# Patient Record
Sex: FEMALE | Race: WHITE | NOT HISPANIC OR LATINO | URBAN - METROPOLITAN AREA
[De-identification: names, ages, dates, MRNs, and addresses within clinical notes are randomized per-mention and may not be internally consistent; named-entity substitution may affect disease eponyms.]

---

## 2019-01-11 ENCOUNTER — APPOINTMENT (RX ONLY)
Dept: URBAN - METROPOLITAN AREA CLINIC 61 | Facility: CLINIC | Age: 34
Setting detail: DERMATOLOGY
End: 2019-01-11

## 2019-01-11 DIAGNOSIS — L70.0 ACNE VULGARIS: ICD-10-CM

## 2019-01-11 DIAGNOSIS — L81.4 OTHER MELANIN HYPERPIGMENTATION: ICD-10-CM

## 2019-01-11 PROBLEM — D37.01 NEOPLASM OF UNCERTAIN BEHAVIOR OF LIP: Status: ACTIVE | Noted: 2019-01-11

## 2019-01-11 PROCEDURE — ? PRESCRIPTION

## 2019-01-11 PROCEDURE — 99202 OFFICE O/P NEW SF 15 MIN: CPT

## 2019-01-11 PROCEDURE — ? COUNSELING

## 2019-01-11 RX ORDER — HYDROQUINONE 4 %
CREAM (GRAM) TOPICAL
Qty: 1 | Refills: 3 | Status: ERX | COMMUNITY
Start: 2019-01-11

## 2019-01-11 RX ORDER — CLINDAMYCIN PHOSPHATE 10 MG/ML
SOLUTION TOPICAL
Qty: 1 | Refills: 10 | Status: ERX | COMMUNITY
Start: 2019-01-11

## 2019-01-11 RX ORDER — DOXYCYCLINE HYCLATE 100 MG/1
TABLET, COATED ORAL
Qty: 60 | Refills: 6 | Status: ERX | COMMUNITY
Start: 2019-01-11

## 2019-01-11 RX ADMIN — DOXYCYCLINE HYCLATE: 100 TABLET, COATED ORAL at 13:54

## 2019-01-11 RX ADMIN — CLINDAMYCIN PHOSPHATE: 10 SOLUTION TOPICAL at 14:08

## 2019-01-11 RX ADMIN — Medication: at 14:02

## 2019-01-11 ASSESSMENT — LOCATION SIMPLE DESCRIPTION DERM: LOCATION SIMPLE: NECK

## 2019-01-11 ASSESSMENT — LOCATION DETAILED DESCRIPTION DERM
LOCATION DETAILED: LEFT CENTRAL LATERAL NECK
LOCATION DETAILED: RIGHT CENTRAL LATERAL NECK

## 2019-01-11 ASSESSMENT — LOCATION ZONE DERM: LOCATION ZONE: NECK

## 2019-01-11 NOTE — PROCEDURE: COUNSELING
Patient Specific Counseling (Will Not Stick From Patient To Patient): Biopsy when patient is ready.
Detail Level: Detailed

## 2021-07-22 ENCOUNTER — OFFICE VISIT (OUTPATIENT)
Dept: OBSTETRICS AND GYNECOLOGY | Facility: CLINIC | Age: 36
End: 2021-07-22
Attending: OBSTETRICS & GYNECOLOGY
Payer: COMMERCIAL

## 2021-07-22 ENCOUNTER — PROCEDURE VISIT (OUTPATIENT)
Dept: OBSTETRICS AND GYNECOLOGY | Facility: CLINIC | Age: 36
End: 2021-07-22
Payer: COMMERCIAL

## 2021-07-22 VITALS
BODY MASS INDEX: 24.42 KG/M2 | SYSTOLIC BLOOD PRESSURE: 126 MMHG | DIASTOLIC BLOOD PRESSURE: 74 MMHG | WEIGHT: 137.81 LBS | HEIGHT: 63 IN

## 2021-07-22 DIAGNOSIS — N91.2 ABSENT MENSES: Primary | ICD-10-CM

## 2021-07-22 DIAGNOSIS — Z36.89 ESTABLISH GESTATIONAL AGE, ULTRASOUND: ICD-10-CM

## 2021-07-22 DIAGNOSIS — N91.2 ABSENT MENSES: ICD-10-CM

## 2021-07-22 PROCEDURE — 76801 PR US, OB <14WKS, TRANSABD, SINGLE GESTATION: ICD-10-PCS | Mod: S$GLB,,, | Performed by: OBSTETRICS & GYNECOLOGY

## 2021-07-22 PROCEDURE — 99204 OFFICE O/P NEW MOD 45 MIN: CPT | Mod: S$GLB,,, | Performed by: OBSTETRICS & GYNECOLOGY

## 2021-07-22 PROCEDURE — 3008F BODY MASS INDEX DOCD: CPT | Mod: CPTII,S$GLB,, | Performed by: OBSTETRICS & GYNECOLOGY

## 2021-07-22 PROCEDURE — 99999 PR PBB SHADOW E&M-NEW PATIENT-LVL III: CPT | Mod: PBBFAC,,, | Performed by: OBSTETRICS & GYNECOLOGY

## 2021-07-22 PROCEDURE — 1126F PR PAIN SEVERITY QUANTIFIED, NO PAIN PRESENT: ICD-10-PCS | Mod: CPTII,S$GLB,, | Performed by: OBSTETRICS & GYNECOLOGY

## 2021-07-22 PROCEDURE — 1126F AMNT PAIN NOTED NONE PRSNT: CPT | Mod: CPTII,S$GLB,, | Performed by: OBSTETRICS & GYNECOLOGY

## 2021-07-22 PROCEDURE — 99204 PR OFFICE/OUTPT VISIT, NEW, LEVL IV, 45-59 MIN: ICD-10-PCS | Mod: S$GLB,,, | Performed by: OBSTETRICS & GYNECOLOGY

## 2021-07-22 PROCEDURE — 3008F PR BODY MASS INDEX (BMI) DOCUMENTED: ICD-10-PCS | Mod: CPTII,S$GLB,, | Performed by: OBSTETRICS & GYNECOLOGY

## 2021-07-22 PROCEDURE — 99999 PR PBB SHADOW E&M-NEW PATIENT-LVL III: ICD-10-PCS | Mod: PBBFAC,,, | Performed by: OBSTETRICS & GYNECOLOGY

## 2021-07-22 PROCEDURE — 76801 OB US < 14 WKS SINGLE FETUS: CPT | Mod: S$GLB,,, | Performed by: OBSTETRICS & GYNECOLOGY

## 2021-07-22 RX ORDER — METHYLPHENIDATE HYDROCHLORIDE EXTENDED RELEASE 20 MG/1
TABLET ORAL
COMMUNITY
End: 2021-11-29

## 2021-07-29 ENCOUNTER — TELEPHONE (OUTPATIENT)
Dept: OBSTETRICS AND GYNECOLOGY | Facility: CLINIC | Age: 36
End: 2021-07-29

## 2021-08-16 ENCOUNTER — PATIENT MESSAGE (OUTPATIENT)
Dept: ADMINISTRATIVE | Facility: OTHER | Age: 36
End: 2021-08-16

## 2021-08-16 ENCOUNTER — INITIAL PRENATAL (OUTPATIENT)
Dept: OBSTETRICS AND GYNECOLOGY | Facility: CLINIC | Age: 36
End: 2021-08-16
Attending: OBSTETRICS & GYNECOLOGY
Payer: COMMERCIAL

## 2021-08-16 VITALS
DIASTOLIC BLOOD PRESSURE: 84 MMHG | BODY MASS INDEX: 24.95 KG/M2 | WEIGHT: 140.88 LBS | SYSTOLIC BLOOD PRESSURE: 132 MMHG

## 2021-08-16 DIAGNOSIS — O09.511 PRIMIGRAVIDA OF ADVANCED MATERNAL AGE IN FIRST TRIMESTER: Primary | ICD-10-CM

## 2021-08-16 DIAGNOSIS — Z3A.13 13 WEEKS GESTATION OF PREGNANCY: ICD-10-CM

## 2021-08-16 DIAGNOSIS — Z36.89 ENCOUNTER FOR FETAL ANATOMIC SURVEY: ICD-10-CM

## 2021-08-16 PROCEDURE — 0502F PR SUBSEQUENT PRENATAL CARE: ICD-10-PCS | Mod: CPTII,S$GLB,, | Performed by: OBSTETRICS & GYNECOLOGY

## 2021-08-16 PROCEDURE — 0502F SUBSEQUENT PRENATAL CARE: CPT | Mod: CPTII,S$GLB,, | Performed by: OBSTETRICS & GYNECOLOGY

## 2021-08-16 PROCEDURE — 99999 PR PBB SHADOW E&M-EST. PATIENT-LVL II: CPT | Mod: PBBFAC,,, | Performed by: OBSTETRICS & GYNECOLOGY

## 2021-08-16 PROCEDURE — 99999 PR PBB SHADOW E&M-EST. PATIENT-LVL II: ICD-10-PCS | Mod: PBBFAC,,, | Performed by: OBSTETRICS & GYNECOLOGY

## 2021-09-13 ENCOUNTER — ROUTINE PRENATAL (OUTPATIENT)
Dept: OBSTETRICS AND GYNECOLOGY | Facility: CLINIC | Age: 36
End: 2021-09-13
Attending: OBSTETRICS & GYNECOLOGY
Payer: COMMERCIAL

## 2021-09-13 VITALS
DIASTOLIC BLOOD PRESSURE: 68 MMHG | WEIGHT: 144.38 LBS | SYSTOLIC BLOOD PRESSURE: 114 MMHG | BODY MASS INDEX: 25.58 KG/M2

## 2021-09-13 DIAGNOSIS — Z3A.17 17 WEEKS GESTATION OF PREGNANCY: ICD-10-CM

## 2021-09-13 DIAGNOSIS — O09.529 ANTEPARTUM MULTIGRAVIDA OF ADVANCED MATERNAL AGE: Primary | ICD-10-CM

## 2021-09-13 PROCEDURE — 99999 PR PBB SHADOW E&M-EST. PATIENT-LVL III: ICD-10-PCS | Mod: PBBFAC,,, | Performed by: OBSTETRICS & GYNECOLOGY

## 2021-09-13 PROCEDURE — 0502F SUBSEQUENT PRENATAL CARE: CPT | Mod: CPTII,S$GLB,, | Performed by: OBSTETRICS & GYNECOLOGY

## 2021-09-13 PROCEDURE — 0502F PR SUBSEQUENT PRENATAL CARE: ICD-10-PCS | Mod: CPTII,S$GLB,, | Performed by: OBSTETRICS & GYNECOLOGY

## 2021-09-13 PROCEDURE — 99999 PR PBB SHADOW E&M-EST. PATIENT-LVL III: CPT | Mod: PBBFAC,,, | Performed by: OBSTETRICS & GYNECOLOGY

## 2021-09-13 RX ORDER — ASPIRIN 81 MG/1
81 TABLET ORAL DAILY
Status: ON HOLD | COMMUNITY
End: 2022-02-03 | Stop reason: HOSPADM

## 2021-09-19 ENCOUNTER — PATIENT MESSAGE (OUTPATIENT)
Dept: OBSTETRICS AND GYNECOLOGY | Facility: CLINIC | Age: 36
End: 2021-09-19

## 2021-09-24 ENCOUNTER — PATIENT MESSAGE (OUTPATIENT)
Dept: MATERNAL FETAL MEDICINE | Facility: CLINIC | Age: 36
End: 2021-09-24

## 2021-09-27 ENCOUNTER — TELEPHONE (OUTPATIENT)
Dept: OBSTETRICS AND GYNECOLOGY | Facility: CLINIC | Age: 36
End: 2021-09-27

## 2021-09-27 ENCOUNTER — PROCEDURE VISIT (OUTPATIENT)
Dept: MATERNAL FETAL MEDICINE | Facility: CLINIC | Age: 36
End: 2021-09-27
Attending: OBSTETRICS & GYNECOLOGY
Payer: COMMERCIAL

## 2021-09-27 DIAGNOSIS — Z36.89 ENCOUNTER FOR FETAL ANATOMIC SURVEY: ICD-10-CM

## 2021-09-27 DIAGNOSIS — O09.512 PRIMIGRAVIDA OF ADVANCED MATERNAL AGE IN SECOND TRIMESTER: ICD-10-CM

## 2021-09-27 PROCEDURE — 76811 OB US DETAILED SNGL FETUS: CPT | Mod: S$GLB,,, | Performed by: OBSTETRICS & GYNECOLOGY

## 2021-09-27 PROCEDURE — 76811 PR US, OB FETAL EVAL & EXAM, TRANSABDOM,FIRST GESTATION: ICD-10-PCS | Mod: S$GLB,,, | Performed by: OBSTETRICS & GYNECOLOGY

## 2021-10-18 ENCOUNTER — ROUTINE PRENATAL (OUTPATIENT)
Dept: OBSTETRICS AND GYNECOLOGY | Facility: CLINIC | Age: 36
End: 2021-10-18
Attending: OBSTETRICS & GYNECOLOGY
Payer: COMMERCIAL

## 2021-10-18 VITALS — WEIGHT: 147.94 LBS | DIASTOLIC BLOOD PRESSURE: 70 MMHG | SYSTOLIC BLOOD PRESSURE: 104 MMHG | BODY MASS INDEX: 26.2 KG/M2

## 2021-10-18 DIAGNOSIS — Z3A.22 22 WEEKS GESTATION OF PREGNANCY: Primary | ICD-10-CM

## 2021-10-18 DIAGNOSIS — O09.512 PRIMIGRAVIDA OF ADVANCED MATERNAL AGE IN SECOND TRIMESTER: ICD-10-CM

## 2021-10-18 PROCEDURE — 99999 PR PBB SHADOW E&M-EST. PATIENT-LVL II: CPT | Mod: PBBFAC,,, | Performed by: OBSTETRICS & GYNECOLOGY

## 2021-10-18 PROCEDURE — 0502F PR SUBSEQUENT PRENATAL CARE: ICD-10-PCS | Mod: CPTII,S$GLB,, | Performed by: OBSTETRICS & GYNECOLOGY

## 2021-10-18 PROCEDURE — 90686 IIV4 VACC NO PRSV 0.5 ML IM: CPT | Mod: S$GLB,,, | Performed by: OBSTETRICS & GYNECOLOGY

## 2021-10-18 PROCEDURE — 90686 FLU VACCINE (QUAD) GREATER THAN OR EQUAL TO 3YO PRESERVATIVE FREE IM: ICD-10-PCS | Mod: S$GLB,,, | Performed by: OBSTETRICS & GYNECOLOGY

## 2021-10-18 PROCEDURE — 0502F SUBSEQUENT PRENATAL CARE: CPT | Mod: CPTII,S$GLB,, | Performed by: OBSTETRICS & GYNECOLOGY

## 2021-10-18 PROCEDURE — 90471 IMMUNIZATION ADMIN: CPT | Mod: S$GLB,,, | Performed by: OBSTETRICS & GYNECOLOGY

## 2021-10-18 PROCEDURE — 99999 PR PBB SHADOW E&M-EST. PATIENT-LVL II: ICD-10-PCS | Mod: PBBFAC,,, | Performed by: OBSTETRICS & GYNECOLOGY

## 2021-10-18 PROCEDURE — 90471 FLU VACCINE (QUAD) GREATER THAN OR EQUAL TO 3YO PRESERVATIVE FREE IM: ICD-10-PCS | Mod: S$GLB,,, | Performed by: OBSTETRICS & GYNECOLOGY

## 2021-11-11 ENCOUNTER — PATIENT MESSAGE (OUTPATIENT)
Dept: OBSTETRICS AND GYNECOLOGY | Facility: CLINIC | Age: 36
End: 2021-11-11
Payer: COMMERCIAL

## 2021-11-23 ENCOUNTER — PATIENT MESSAGE (OUTPATIENT)
Dept: ADMINISTRATIVE | Facility: OTHER | Age: 36
End: 2021-11-23
Payer: COMMERCIAL

## 2021-11-29 ENCOUNTER — TELEPHONE (OUTPATIENT)
Dept: OBSTETRICS AND GYNECOLOGY | Facility: CLINIC | Age: 36
End: 2021-11-29

## 2021-11-29 ENCOUNTER — ROUTINE PRENATAL (OUTPATIENT)
Dept: OBSTETRICS AND GYNECOLOGY | Facility: CLINIC | Age: 36
End: 2021-11-29
Attending: OBSTETRICS & GYNECOLOGY
Payer: COMMERCIAL

## 2021-11-29 ENCOUNTER — TELEPHONE (OUTPATIENT)
Dept: OBSTETRICS AND GYNECOLOGY | Facility: CLINIC | Age: 36
End: 2021-11-29
Payer: COMMERCIAL

## 2021-11-29 ENCOUNTER — LAB VISIT (OUTPATIENT)
Dept: LAB | Facility: OTHER | Age: 36
End: 2021-11-29
Attending: OBSTETRICS & GYNECOLOGY
Payer: COMMERCIAL

## 2021-11-29 VITALS
SYSTOLIC BLOOD PRESSURE: 100 MMHG | WEIGHT: 154.31 LBS | BODY MASS INDEX: 27.34 KG/M2 | DIASTOLIC BLOOD PRESSURE: 60 MMHG

## 2021-11-29 DIAGNOSIS — R73.09 ELEVATED GLUCOSE: Primary | ICD-10-CM

## 2021-11-29 DIAGNOSIS — O09.513 PRIMIGRAVIDA OF ADVANCED MATERNAL AGE IN THIRD TRIMESTER: Primary | ICD-10-CM

## 2021-11-29 DIAGNOSIS — Z3A.28 28 WEEKS GESTATION OF PREGNANCY: ICD-10-CM

## 2021-11-29 DIAGNOSIS — Z3A.22 22 WEEKS GESTATION OF PREGNANCY: ICD-10-CM

## 2021-11-29 LAB
ERYTHROCYTE [DISTWIDTH] IN BLOOD BY AUTOMATED COUNT: 13 % (ref 11.5–14.5)
GLUCOSE SERPL-MCNC: 184 MG/DL (ref 70–140)
HCT VFR BLD AUTO: 35 % (ref 37–48.5)
HGB BLD-MCNC: 11.8 G/DL (ref 12–16)
MCH RBC QN AUTO: 31.2 PG (ref 27–31)
MCHC RBC AUTO-ENTMCNC: 33.7 G/DL (ref 32–36)
MCV RBC AUTO: 93 FL (ref 82–98)
PLATELET # BLD AUTO: 226 K/UL (ref 150–450)
PMV BLD AUTO: 10.4 FL (ref 9.2–12.9)
RBC # BLD AUTO: 3.78 M/UL (ref 4–5.4)
WBC # BLD AUTO: 8.8 K/UL (ref 3.9–12.7)

## 2021-11-29 PROCEDURE — 36415 COLL VENOUS BLD VENIPUNCTURE: CPT | Performed by: OBSTETRICS & GYNECOLOGY

## 2021-11-29 PROCEDURE — 0502F PR SUBSEQUENT PRENATAL CARE: ICD-10-PCS | Mod: CPTII,S$GLB,, | Performed by: OBSTETRICS & GYNECOLOGY

## 2021-11-29 PROCEDURE — 82950 GLUCOSE TEST: CPT | Performed by: OBSTETRICS & GYNECOLOGY

## 2021-11-29 PROCEDURE — 0502F SUBSEQUENT PRENATAL CARE: CPT | Mod: CPTII,S$GLB,, | Performed by: OBSTETRICS & GYNECOLOGY

## 2021-11-29 PROCEDURE — 99999 PR PBB SHADOW E&M-EST. PATIENT-LVL III: ICD-10-PCS | Mod: PBBFAC,,, | Performed by: OBSTETRICS & GYNECOLOGY

## 2021-11-29 PROCEDURE — 99999 PR PBB SHADOW E&M-EST. PATIENT-LVL III: CPT | Mod: PBBFAC,,, | Performed by: OBSTETRICS & GYNECOLOGY

## 2021-11-29 PROCEDURE — 85027 COMPLETE CBC AUTOMATED: CPT | Performed by: OBSTETRICS & GYNECOLOGY

## 2021-11-29 RX ORDER — AMOXICILLIN 500 MG
CAPSULE ORAL DAILY
COMMUNITY
End: 2023-09-20

## 2021-11-29 RX ORDER — CALCIUM CARBONATE 200(500)MG
1 TABLET,CHEWABLE ORAL DAILY
Status: ON HOLD | COMMUNITY
End: 2022-02-03 | Stop reason: HOSPADM

## 2021-12-01 ENCOUNTER — LAB VISIT (OUTPATIENT)
Dept: LAB | Facility: OTHER | Age: 36
End: 2021-12-01
Attending: OBSTETRICS & GYNECOLOGY
Payer: COMMERCIAL

## 2021-12-01 ENCOUNTER — TELEPHONE (OUTPATIENT)
Dept: OBSTETRICS AND GYNECOLOGY | Facility: CLINIC | Age: 36
End: 2021-12-01
Payer: COMMERCIAL

## 2021-12-01 ENCOUNTER — PATIENT MESSAGE (OUTPATIENT)
Dept: OBSTETRICS AND GYNECOLOGY | Facility: CLINIC | Age: 36
End: 2021-12-01
Payer: COMMERCIAL

## 2021-12-01 DIAGNOSIS — R73.09 ELEVATED GLUCOSE: ICD-10-CM

## 2021-12-01 LAB
GLUCOSE SERPL-MCNC: 118 MG/DL
GLUCOSE SERPL-MCNC: 149 MG/DL
GLUCOSE SERPL-MCNC: 81 MG/DL (ref 70–110)
GLUCOSE SERPL-MCNC: 93 MG/DL

## 2021-12-01 PROCEDURE — 82952 GTT-ADDED SAMPLES: CPT | Performed by: OBSTETRICS & GYNECOLOGY

## 2021-12-01 PROCEDURE — 82951 GLUCOSE TOLERANCE TEST (GTT): CPT | Performed by: OBSTETRICS & GYNECOLOGY

## 2021-12-01 PROCEDURE — 36415 COLL VENOUS BLD VENIPUNCTURE: CPT | Performed by: OBSTETRICS & GYNECOLOGY

## 2021-12-09 ENCOUNTER — PROCEDURE VISIT (OUTPATIENT)
Dept: OBSTETRICS AND GYNECOLOGY | Facility: CLINIC | Age: 36
End: 2021-12-09
Attending: OBSTETRICS & GYNECOLOGY
Payer: COMMERCIAL

## 2021-12-09 VITALS — BODY MASS INDEX: 27.45 KG/M2 | DIASTOLIC BLOOD PRESSURE: 64 MMHG | WEIGHT: 155 LBS | SYSTOLIC BLOOD PRESSURE: 102 MMHG

## 2021-12-09 DIAGNOSIS — O09.529 ANTEPARTUM MULTIGRAVIDA OF ADVANCED MATERNAL AGE: Primary | ICD-10-CM

## 2021-12-09 DIAGNOSIS — Z23 NEED FOR TDAP VACCINATION: Primary | ICD-10-CM

## 2021-12-09 DIAGNOSIS — O09.529 ANTEPARTUM MULTIGRAVIDA OF ADVANCED MATERNAL AGE: ICD-10-CM

## 2021-12-09 DIAGNOSIS — Z3A.30 30 WEEKS GESTATION OF PREGNANCY: ICD-10-CM

## 2021-12-09 DIAGNOSIS — Z23 NEED FOR VACCINATION: ICD-10-CM

## 2021-12-09 PROCEDURE — 99999 PR PBB SHADOW E&M-EST. PATIENT-LVL III: ICD-10-PCS | Mod: PBBFAC,,, | Performed by: OBSTETRICS & GYNECOLOGY

## 2021-12-09 PROCEDURE — 76816 OB US FOLLOW-UP PER FETUS: CPT | Mod: S$GLB,,, | Performed by: OBSTETRICS & GYNECOLOGY

## 2021-12-09 PROCEDURE — 0502F SUBSEQUENT PRENATAL CARE: CPT | Mod: CPTII,S$GLB,, | Performed by: OBSTETRICS & GYNECOLOGY

## 2021-12-09 PROCEDURE — 0502F PR SUBSEQUENT PRENATAL CARE: ICD-10-PCS | Mod: CPTII,S$GLB,, | Performed by: OBSTETRICS & GYNECOLOGY

## 2021-12-09 PROCEDURE — 99999 PR PBB SHADOW E&M-EST. PATIENT-LVL III: CPT | Mod: PBBFAC,,, | Performed by: OBSTETRICS & GYNECOLOGY

## 2021-12-09 PROCEDURE — 90471 IMMUNIZATION ADMIN: CPT | Mod: S$GLB,,, | Performed by: OBSTETRICS & GYNECOLOGY

## 2021-12-09 PROCEDURE — 90471 TDAP VACCINE GREATER THAN OR EQUAL TO 7YO IM: ICD-10-PCS | Mod: S$GLB,,, | Performed by: OBSTETRICS & GYNECOLOGY

## 2021-12-09 PROCEDURE — 76816 US OB/GYN EXTENDED PROCEDURE (VIEWPOINT): ICD-10-PCS | Mod: S$GLB,,, | Performed by: OBSTETRICS & GYNECOLOGY

## 2021-12-09 PROCEDURE — 90715 TDAP VACCINE 7 YRS/> IM: CPT | Mod: S$GLB,,, | Performed by: OBSTETRICS & GYNECOLOGY

## 2021-12-09 PROCEDURE — 90715 TDAP VACCINE GREATER THAN OR EQUAL TO 7YO IM: ICD-10-PCS | Mod: S$GLB,,, | Performed by: OBSTETRICS & GYNECOLOGY

## 2021-12-21 ENCOUNTER — PATIENT MESSAGE (OUTPATIENT)
Dept: ADMINISTRATIVE | Facility: OTHER | Age: 36
End: 2021-12-21
Payer: COMMERCIAL

## 2021-12-27 ENCOUNTER — OFFICE VISIT (OUTPATIENT)
Dept: OBSTETRICS AND GYNECOLOGY | Facility: CLINIC | Age: 36
End: 2021-12-27
Attending: OBSTETRICS & GYNECOLOGY
Payer: COMMERCIAL

## 2021-12-27 DIAGNOSIS — O09.529 ANTEPARTUM MULTIGRAVIDA OF ADVANCED MATERNAL AGE: Primary | ICD-10-CM

## 2021-12-27 DIAGNOSIS — Z3A.33 33 WEEKS GESTATION OF PREGNANCY: ICD-10-CM

## 2021-12-27 DIAGNOSIS — Z3A.32 32 WEEKS GESTATION OF PREGNANCY: ICD-10-CM

## 2021-12-27 PROCEDURE — 1159F PR MEDICATION LIST DOCUMENTED IN MEDICAL RECORD: ICD-10-PCS | Mod: CPTII,95,, | Performed by: OBSTETRICS & GYNECOLOGY

## 2021-12-27 PROCEDURE — 1159F MED LIST DOCD IN RCRD: CPT | Mod: CPTII,95,, | Performed by: OBSTETRICS & GYNECOLOGY

## 2021-12-27 PROCEDURE — 0502F SUBSEQUENT PRENATAL CARE: CPT | Mod: CPTII,95,, | Performed by: OBSTETRICS & GYNECOLOGY

## 2021-12-27 PROCEDURE — 1160F RVW MEDS BY RX/DR IN RCRD: CPT | Mod: CPTII,95,, | Performed by: OBSTETRICS & GYNECOLOGY

## 2021-12-27 PROCEDURE — 1160F PR REVIEW ALL MEDS BY PRESCRIBER/CLIN PHARMACIST DOCUMENTED: ICD-10-PCS | Mod: CPTII,95,, | Performed by: OBSTETRICS & GYNECOLOGY

## 2021-12-27 PROCEDURE — 0502F PR SUBSEQUENT PRENATAL CARE: ICD-10-PCS | Mod: CPTII,95,, | Performed by: OBSTETRICS & GYNECOLOGY

## 2021-12-31 ENCOUNTER — PATIENT MESSAGE (OUTPATIENT)
Dept: OBSTETRICS AND GYNECOLOGY | Facility: CLINIC | Age: 36
End: 2021-12-31
Payer: COMMERCIAL

## 2022-01-10 ENCOUNTER — ROUTINE PRENATAL (OUTPATIENT)
Dept: OBSTETRICS AND GYNECOLOGY | Facility: CLINIC | Age: 37
End: 2022-01-10
Attending: OBSTETRICS & GYNECOLOGY
Payer: COMMERCIAL

## 2022-01-10 VITALS
DIASTOLIC BLOOD PRESSURE: 90 MMHG | WEIGHT: 161.81 LBS | BODY MASS INDEX: 28.66 KG/M2 | SYSTOLIC BLOOD PRESSURE: 128 MMHG

## 2022-01-10 DIAGNOSIS — Z3A.34 34 WEEKS GESTATION OF PREGNANCY: ICD-10-CM

## 2022-01-10 DIAGNOSIS — O09.513 PRIMIGRAVIDA OF ADVANCED MATERNAL AGE IN THIRD TRIMESTER: Primary | ICD-10-CM

## 2022-01-10 PROCEDURE — 0502F PR SUBSEQUENT PRENATAL CARE: ICD-10-PCS | Mod: CPTII,S$GLB,, | Performed by: OBSTETRICS & GYNECOLOGY

## 2022-01-10 PROCEDURE — 0502F SUBSEQUENT PRENATAL CARE: CPT | Mod: CPTII,S$GLB,, | Performed by: OBSTETRICS & GYNECOLOGY

## 2022-01-10 PROCEDURE — 99999 PR PBB SHADOW E&M-EST. PATIENT-LVL III: ICD-10-PCS | Mod: PBBFAC,,, | Performed by: OBSTETRICS & GYNECOLOGY

## 2022-01-10 PROCEDURE — 99999 PR PBB SHADOW E&M-EST. PATIENT-LVL III: CPT | Mod: PBBFAC,,, | Performed by: OBSTETRICS & GYNECOLOGY

## 2022-01-10 NOTE — PROGRESS NOTES
Patient is starting to get more anxious about delivery. See Connected MOM BP; all normal. Staying active (biking and swimming). Baby meets [facility] criteria. Discussed labs, GBBS, consents next visit. Yevgeniy is still working with patient.

## 2022-01-24 ENCOUNTER — PATIENT MESSAGE (OUTPATIENT)
Dept: OBSTETRICS AND GYNECOLOGY | Facility: CLINIC | Age: 37
End: 2022-01-24

## 2022-01-24 ENCOUNTER — ROUTINE PRENATAL (OUTPATIENT)
Dept: OBSTETRICS AND GYNECOLOGY | Facility: CLINIC | Age: 37
End: 2022-01-24
Attending: OBSTETRICS & GYNECOLOGY
Payer: COMMERCIAL

## 2022-01-24 ENCOUNTER — LAB VISIT (OUTPATIENT)
Dept: LAB | Facility: OTHER | Age: 37
End: 2022-01-24
Attending: OBSTETRICS & GYNECOLOGY
Payer: COMMERCIAL

## 2022-01-24 VITALS
WEIGHT: 165.44 LBS | BODY MASS INDEX: 29.31 KG/M2 | DIASTOLIC BLOOD PRESSURE: 84 MMHG | SYSTOLIC BLOOD PRESSURE: 126 MMHG

## 2022-01-24 DIAGNOSIS — O09.513 PRIMIGRAVIDA OF ADVANCED MATERNAL AGE IN THIRD TRIMESTER: Primary | ICD-10-CM

## 2022-01-24 DIAGNOSIS — Z3A.36 36 WEEKS GESTATION OF PREGNANCY: ICD-10-CM

## 2022-01-24 LAB
BASOPHILS # BLD AUTO: 0.03 K/UL (ref 0–0.2)
BASOPHILS NFR BLD: 0.3 % (ref 0–1.9)
DIFFERENTIAL METHOD: NORMAL
EOSINOPHIL # BLD AUTO: 0.1 K/UL (ref 0–0.5)
EOSINOPHIL NFR BLD: 0.8 % (ref 0–8)
ERYTHROCYTE [DISTWIDTH] IN BLOOD BY AUTOMATED COUNT: 12.9 % (ref 11.5–14.5)
HCT VFR BLD AUTO: 38.7 % (ref 37–48.5)
HGB BLD-MCNC: 13.1 G/DL (ref 12–16)
IMM GRANULOCYTES # BLD AUTO: 0.03 K/UL (ref 0–0.04)
IMM GRANULOCYTES NFR BLD AUTO: 0.3 % (ref 0–0.5)
LYMPHOCYTES # BLD AUTO: 2.4 K/UL (ref 1–4.8)
LYMPHOCYTES NFR BLD: 26 % (ref 18–48)
MCH RBC QN AUTO: 31 PG (ref 27–31)
MCHC RBC AUTO-ENTMCNC: 33.9 G/DL (ref 32–36)
MCV RBC AUTO: 92 FL (ref 82–98)
MONOCYTES # BLD AUTO: 0.7 K/UL (ref 0.3–1)
MONOCYTES NFR BLD: 7.3 % (ref 4–15)
NEUTROPHILS # BLD AUTO: 6 K/UL (ref 1.8–7.7)
NEUTROPHILS NFR BLD: 65.3 % (ref 38–73)
NRBC BLD-RTO: 0 /100 WBC
PLATELET # BLD AUTO: 170 K/UL (ref 150–450)
PMV BLD AUTO: 12.7 FL (ref 9.2–12.9)
RBC # BLD AUTO: 4.23 M/UL (ref 4–5.4)
WBC # BLD AUTO: 9.24 K/UL (ref 3.9–12.7)

## 2022-01-24 PROCEDURE — 0502F SUBSEQUENT PRENATAL CARE: CPT | Mod: CPTII,S$GLB,, | Performed by: OBSTETRICS & GYNECOLOGY

## 2022-01-24 PROCEDURE — 0502F PR SUBSEQUENT PRENATAL CARE: ICD-10-PCS | Mod: CPTII,S$GLB,, | Performed by: OBSTETRICS & GYNECOLOGY

## 2022-01-24 PROCEDURE — 85025 COMPLETE CBC W/AUTO DIFF WBC: CPT | Performed by: OBSTETRICS & GYNECOLOGY

## 2022-01-24 PROCEDURE — 87081 CULTURE SCREEN ONLY: CPT | Performed by: OBSTETRICS & GYNECOLOGY

## 2022-01-24 PROCEDURE — 99999 PR PBB SHADOW E&M-EST. PATIENT-LVL III: ICD-10-PCS | Mod: PBBFAC,,, | Performed by: OBSTETRICS & GYNECOLOGY

## 2022-01-24 PROCEDURE — 86592 SYPHILIS TEST NON-TREP QUAL: CPT | Performed by: OBSTETRICS & GYNECOLOGY

## 2022-01-24 PROCEDURE — 87389 HIV-1 AG W/HIV-1&-2 AB AG IA: CPT | Performed by: OBSTETRICS & GYNECOLOGY

## 2022-01-24 PROCEDURE — 99999 PR PBB SHADOW E&M-EST. PATIENT-LVL III: CPT | Mod: PBBFAC,,, | Performed by: OBSTETRICS & GYNECOLOGY

## 2022-01-24 PROCEDURE — 36415 COLL VENOUS BLD VENIPUNCTURE: CPT | Performed by: OBSTETRICS & GYNECOLOGY

## 2022-01-24 NOTE — PROGRESS NOTES
Getting uncomfortable but still remains active. [place]ed [facility]. Consents, GBBS, labs done. Labor/bleeding precautions given. Patient will send birth plan to me in portal message for us to review at her next appointment.

## 2022-01-25 LAB
HIV 1+2 AB+HIV1 P24 AG SERPL QL IA: NEGATIVE
RPR SER QL: NORMAL

## 2022-01-27 LAB — BACTERIA SPEC AEROBE CULT: NORMAL

## 2022-01-31 ENCOUNTER — HOSPITAL ENCOUNTER (INPATIENT)
Facility: OTHER | Age: 37
LOS: 1 days | Discharge: HOME | DRG: 833 | End: 2022-01-31
Attending: OBSTETRICS & GYNECOLOGY | Admitting: OBSTETRICS & GYNECOLOGY
Payer: COMMERCIAL

## 2022-01-31 ENCOUNTER — PATIENT MESSAGE (OUTPATIENT)
Dept: OBSTETRICS AND GYNECOLOGY | Facility: OTHER | Age: 37
End: 2022-01-31
Payer: COMMERCIAL

## 2022-01-31 ENCOUNTER — ROUTINE PRENATAL (OUTPATIENT)
Dept: OBSTETRICS AND GYNECOLOGY | Facility: CLINIC | Age: 37
DRG: 833 | End: 2022-01-31
Attending: OBSTETRICS & GYNECOLOGY
Payer: COMMERCIAL

## 2022-01-31 VITALS
DIASTOLIC BLOOD PRESSURE: 69 MMHG | SYSTOLIC BLOOD PRESSURE: 98 MMHG | TEMPERATURE: 98 F | OXYGEN SATURATION: 96 % | HEART RATE: 65 BPM | RESPIRATION RATE: 18 BRPM

## 2022-01-31 VITALS
WEIGHT: 164.56 LBS | SYSTOLIC BLOOD PRESSURE: 128 MMHG | BODY MASS INDEX: 29.15 KG/M2 | DIASTOLIC BLOOD PRESSURE: 100 MMHG

## 2022-01-31 DIAGNOSIS — O09.513 PRIMIGRAVIDA OF ADVANCED MATERNAL AGE IN THIRD TRIMESTER: Primary | ICD-10-CM

## 2022-01-31 DIAGNOSIS — O13.3 GESTATIONAL HYPERTENSION, THIRD TRIMESTER: Primary | ICD-10-CM

## 2022-01-31 DIAGNOSIS — Z3A.37 37 WEEKS GESTATION OF PREGNANCY: ICD-10-CM

## 2022-01-31 PROBLEM — D69.6 BENIGN GESTATIONAL THROMBOCYTOPENIA IN THIRD TRIMESTER: Status: ACTIVE | Noted: 2022-01-31

## 2022-01-31 PROBLEM — O99.113 BENIGN GESTATIONAL THROMBOCYTOPENIA IN THIRD TRIMESTER: Status: ACTIVE | Noted: 2022-01-31

## 2022-01-31 LAB
ALBUMIN SERPL BCP-MCNC: 2.9 G/DL (ref 3.5–5.2)
ALP SERPL-CCNC: 176 U/L (ref 55–135)
ALT SERPL W/O P-5'-P-CCNC: 9 U/L (ref 10–44)
ANION GAP SERPL CALC-SCNC: 10 MMOL/L (ref 8–16)
AST SERPL-CCNC: 25 U/L (ref 10–40)
BASOPHILS # BLD AUTO: 0.03 K/UL (ref 0–0.2)
BASOPHILS NFR BLD: 0.4 % (ref 0–1.9)
BILIRUB SERPL-MCNC: 0.4 MG/DL (ref 0.1–1)
BUN SERPL-MCNC: 11 MG/DL (ref 6–20)
CALCIUM SERPL-MCNC: 9.4 MG/DL (ref 8.7–10.5)
CHLORIDE SERPL-SCNC: 109 MMOL/L (ref 95–110)
CO2 SERPL-SCNC: 16 MMOL/L (ref 23–29)
CREAT SERPL-MCNC: 0.8 MG/DL (ref 0.5–1.4)
CREAT UR-MCNC: 22.8 MG/DL (ref 15–325)
DIFFERENTIAL METHOD: ABNORMAL
EOSINOPHIL # BLD AUTO: 0.1 K/UL (ref 0–0.5)
EOSINOPHIL NFR BLD: 1 % (ref 0–8)
ERYTHROCYTE [DISTWIDTH] IN BLOOD BY AUTOMATED COUNT: 12.9 % (ref 11.5–14.5)
EST. GFR  (AFRICAN AMERICAN): >60 ML/MIN/1.73 M^2
EST. GFR  (NON AFRICAN AMERICAN): >60 ML/MIN/1.73 M^2
GLUCOSE SERPL-MCNC: 71 MG/DL (ref 70–110)
HCT VFR BLD AUTO: 37.9 % (ref 37–48.5)
HGB BLD-MCNC: 13 G/DL (ref 12–16)
IMM GRANULOCYTES # BLD AUTO: 0.06 K/UL (ref 0–0.04)
IMM GRANULOCYTES NFR BLD AUTO: 0.7 % (ref 0–0.5)
LYMPHOCYTES # BLD AUTO: 2.1 K/UL (ref 1–4.8)
LYMPHOCYTES NFR BLD: 25.8 % (ref 18–48)
MCH RBC QN AUTO: 31.5 PG (ref 27–31)
MCHC RBC AUTO-ENTMCNC: 34.3 G/DL (ref 32–36)
MCV RBC AUTO: 92 FL (ref 82–98)
MONOCYTES # BLD AUTO: 0.6 K/UL (ref 0.3–1)
MONOCYTES NFR BLD: 7.1 % (ref 4–15)
NEUTROPHILS # BLD AUTO: 5.4 K/UL (ref 1.8–7.7)
NEUTROPHILS NFR BLD: 65 % (ref 38–73)
NRBC BLD-RTO: 0 /100 WBC
PLATELET # BLD AUTO: 146 K/UL (ref 150–450)
PMV BLD AUTO: 11.8 FL (ref 9.2–12.9)
POTASSIUM SERPL-SCNC: 4 MMOL/L (ref 3.5–5.1)
PROT SERPL-MCNC: 6.5 G/DL (ref 6–8.4)
PROT UR-MCNC: <7 MG/DL (ref 0–15)
PROT/CREAT UR: NORMAL MG/G{CREAT} (ref 0–0.2)
RBC # BLD AUTO: 4.13 M/UL (ref 4–5.4)
SODIUM SERPL-SCNC: 135 MMOL/L (ref 136–145)
WBC # BLD AUTO: 8.28 K/UL (ref 3.9–12.7)

## 2022-01-31 PROCEDURE — 84156 ASSAY OF PROTEIN URINE: CPT

## 2022-01-31 PROCEDURE — 99999 PR PBB SHADOW E&M-EST. PATIENT-LVL III: CPT | Mod: PBBFAC,,, | Performed by: OBSTETRICS & GYNECOLOGY

## 2022-01-31 PROCEDURE — 80053 COMPREHEN METABOLIC PANEL: CPT

## 2022-01-31 PROCEDURE — 99284 EMERGENCY DEPT VISIT MOD MDM: CPT | Mod: 25,,, | Performed by: OBSTETRICS & GYNECOLOGY

## 2022-01-31 PROCEDURE — 59025 FETAL NON-STRESS TEST: CPT

## 2022-01-31 PROCEDURE — 11000001 HC ACUTE MED/SURG PRIVATE ROOM

## 2022-01-31 PROCEDURE — 59025 FETAL NON-STRESS TEST: CPT | Mod: 26,,, | Performed by: OBSTETRICS & GYNECOLOGY

## 2022-01-31 PROCEDURE — 99284 EMERGENCY DEPT VISIT MOD MDM: CPT | Mod: 25

## 2022-01-31 PROCEDURE — 85025 COMPLETE CBC W/AUTO DIFF WBC: CPT

## 2022-01-31 PROCEDURE — 99999 PR PBB SHADOW E&M-EST. PATIENT-LVL III: ICD-10-PCS | Mod: PBBFAC,,, | Performed by: OBSTETRICS & GYNECOLOGY

## 2022-01-31 PROCEDURE — 0502F PR SUBSEQUENT PRENATAL CARE: ICD-10-PCS | Mod: CPTII,S$GLB,, | Performed by: OBSTETRICS & GYNECOLOGY

## 2022-01-31 PROCEDURE — 0502F SUBSEQUENT PRENATAL CARE: CPT | Mod: CPTII,S$GLB,, | Performed by: OBSTETRICS & GYNECOLOGY

## 2022-01-31 PROCEDURE — 59025 PR FETAL 2N-STRESS TEST: ICD-10-PCS | Mod: 26,,, | Performed by: OBSTETRICS & GYNECOLOGY

## 2022-01-31 PROCEDURE — 99284 PR EMERGENCY DEPT VISIT,LEVEL IV: ICD-10-PCS | Mod: 25,,, | Performed by: OBSTETRICS & GYNECOLOGY

## 2022-01-31 RX ORDER — CEFAZOLIN SODIUM 2 G/50ML
2 SOLUTION INTRAVENOUS ONCE AS NEEDED
Status: DISCONTINUED | OUTPATIENT
Start: 2022-01-31 | End: 2022-01-31 | Stop reason: HOSPADM

## 2022-01-31 RX ORDER — CALCIUM CARBONATE 200(500)MG
500 TABLET,CHEWABLE ORAL 3 TIMES DAILY PRN
Status: DISCONTINUED | OUTPATIENT
Start: 2022-01-31 | End: 2022-01-31 | Stop reason: HOSPADM

## 2022-01-31 RX ORDER — ONDANSETRON 8 MG/1
8 TABLET, ORALLY DISINTEGRATING ORAL EVERY 8 HOURS PRN
Status: DISCONTINUED | OUTPATIENT
Start: 2022-01-31 | End: 2022-01-31 | Stop reason: HOSPADM

## 2022-01-31 RX ORDER — OXYTOCIN/RINGER'S LACTATE 30/500 ML
334 PLASTIC BAG, INJECTION (ML) INTRAVENOUS ONCE
Status: DISCONTINUED | OUTPATIENT
Start: 2022-01-31 | End: 2022-01-31 | Stop reason: HOSPADM

## 2022-01-31 RX ORDER — PROCHLORPERAZINE EDISYLATE 5 MG/ML
5 INJECTION INTRAMUSCULAR; INTRAVENOUS EVERY 6 HOURS PRN
Status: DISCONTINUED | OUTPATIENT
Start: 2022-01-31 | End: 2022-01-31 | Stop reason: HOSPADM

## 2022-01-31 RX ORDER — SODIUM CHLORIDE 9 MG/ML
INJECTION, SOLUTION INTRAVENOUS
Status: DISCONTINUED | OUTPATIENT
Start: 2022-01-31 | End: 2022-01-31 | Stop reason: HOSPADM

## 2022-01-31 RX ORDER — METHYLERGONOVINE MALEATE 0.2 MG/ML
200 INJECTION INTRAVENOUS
Status: CANCELLED | OUTPATIENT
Start: 2022-01-31

## 2022-01-31 RX ORDER — TRANEXAMIC ACID 100 MG/ML
1000 INJECTION, SOLUTION INTRAVENOUS ONCE AS NEEDED
Status: DISCONTINUED | OUTPATIENT
Start: 2022-01-31 | End: 2022-01-31 | Stop reason: HOSPADM

## 2022-01-31 RX ORDER — CARBOPROST TROMETHAMINE 250 UG/ML
250 INJECTION, SOLUTION INTRAMUSCULAR
Status: CANCELLED | OUTPATIENT
Start: 2022-01-31

## 2022-01-31 RX ORDER — DIPHENOXYLATE HYDROCHLORIDE AND ATROPINE SULFATE 2.5; .025 MG/1; MG/1
1 TABLET ORAL 4 TIMES DAILY PRN
Status: CANCELLED | OUTPATIENT
Start: 2022-01-31

## 2022-01-31 RX ORDER — SIMETHICONE 80 MG
1 TABLET,CHEWABLE ORAL 4 TIMES DAILY PRN
Status: DISCONTINUED | OUTPATIENT
Start: 2022-01-31 | End: 2022-01-31 | Stop reason: HOSPADM

## 2022-01-31 RX ORDER — SODIUM CHLORIDE, SODIUM LACTATE, POTASSIUM CHLORIDE, CALCIUM CHLORIDE 600; 310; 30; 20 MG/100ML; MG/100ML; MG/100ML; MG/100ML
INJECTION, SOLUTION INTRAVENOUS CONTINUOUS
Status: DISCONTINUED | OUTPATIENT
Start: 2022-01-31 | End: 2022-01-31 | Stop reason: HOSPADM

## 2022-01-31 RX ORDER — OXYTOCIN/RINGER'S LACTATE 30/500 ML
95 PLASTIC BAG, INJECTION (ML) INTRAVENOUS ONCE
Status: DISCONTINUED | OUTPATIENT
Start: 2022-01-31 | End: 2022-01-31 | Stop reason: HOSPADM

## 2022-01-31 RX ORDER — MISOPROSTOL 200 UG/1
800 TABLET ORAL
Status: CANCELLED | OUTPATIENT
Start: 2022-01-31

## 2022-01-31 NOTE — H&P
HISTORY AND PHYSICAL                                                OBSTETRICS          Subjective:       Shadia Lake is a 36 y.o.  female with IUP at 38w0d weeks gestation who presents for induction of labor. This IUP is complicated by gestational hypertension, gestational thrombocytopenia, advanced maternal age.  Patient denies contractions, denies vaginal bleeding, denies LOF.   Fetal Movement: normal.     PMHx:   Past Medical History:   Diagnosis Date    Abnormal Pap smear of cervix     unsure of results- had bx which came back wnl per pt and normal pap since        PSHx:   Past Surgical History:   Procedure Laterality Date    WISDOM TOOTH EXTRACTION         All:   Review of patient's allergies indicates:   Allergen Reactions    Penicillins Rash       Meds:   No medications prior to admission.       SH:   Social History     Socioeconomic History    Marital status:    Tobacco Use    Smoking status: Never Smoker    Smokeless tobacco: Never Used   Substance and Sexual Activity    Alcohol use: Not Currently    Drug use: Never    Sexual activity: Yes     Partners: Male     Birth control/protection: None       FH:   Family History   Problem Relation Age of Onset    Stroke Paternal Grandfather     Lung cancer Paternal Grandmother     Diabetes Maternal Grandfather     Sleep apnea Father     Diabetes Maternal Uncle     Breast cancer Neg Hx     Colon cancer Neg Hx     Hypertension Neg Hx     Ovarian cancer Neg Hx        OBHx:   OB History    Para Term  AB Living   1 0 0 0 0 0   SAB IAB Ectopic Multiple Live Births   0 0 0 0 0      # Outcome Date GA Lbr Juan/2nd Weight Sex Delivery Anes PTL Lv   1 Current                Objective:       LMP 2021 (Exact Date)     There were no vitals filed for this visit.    General:   alert, appears stated age and cooperative   Lungs:   clear to auscultation bilaterally   Heart:   regular rate and rhythm, S1, S2 normal, no murmur,  click, rub or gallop   Abdomen:  soft, non-tender; bowel sounds normal; no masses,  no organomegaly   Extremities negative edema, negative erythema   FHT: See admit FHT monitoring                 TOCO: See admit FHT monitoring   Presentations: cephalic by ultrasound   Cervix:     Dilation: Closed    Effacement: 50%    Station:  -3    Consistency: soft    Position: posterior       EFW by Leopold's: 7 1/2 lbs    Lab Review  Blood Type: A positive  GBBS: negative  Rubella: Immune  RPR: nonreactive  HIV: negative  HepB: negative     (Patient's initial prenatal labs are in media tab under prenatal record 7/22/21)    [id number]: negative, 46XX    Assessment:       38w0d weeks gestation, gestational hypertension, gestational thrombocytopenia, advanced maternal age.    - for induction of labor     Plan:      Risks, benefits, alternatives and possible complications have been discussed in detail with the patient.   - Consents signed and to chart  - Admit to Labor and Delivery unit  - Epidural per Anesthesia prn  - Draw CBC, T&S    Post-Partum Hemorrhage risk - low

## 2022-01-31 NOTE — NURSING
Pt signed AMA form and left Against Medical Advice. Scheduled to return on 2/1/2022 at 0500 for induction.

## 2022-01-31 NOTE — PROGRESS NOTES
Patient denies s/s pre-eclampsia. Baby moves a lot. Reviewed [facility]. Discussed need to go to OB ED for evaluation right now. Patient is closing on her house today. She was able to move closing to right now then go straight to OB ED. She's aware that she'll have serial BP checks and labs. She's aware she may need induction today pending results.

## 2022-01-31 NOTE — MEDICAL/APP STUDENT
[facility] Emergency Dept (OB)  Obstetrics  History & Physical    Patient Name: Shadia Lake  MRN: [id number]  Admission Date: 2022  Primary Care Provider: Primary Doctor No    Subjective:     Principal Problem:Gestational hypertension, third trimester    History of Present Illness: elevated blood pressure reading at clinic appointment.     36F  at 37 weeks 6 days presents with complaint of elevated blood pressure reading at clinic visit today. At clinic reading was 128/100 and on arrival here was 131/96. Patient does report feeling more stressed than normal lately. Denies headache, vision changes, nausea, vomiting, contractions, vaginal bleeding, urinary changes, or pelvic pain. No LOF. Eating enough food and drinking enough water. Reports fetal movement.     Obstetric HPI:  Patient reports None contractions, active fetal movement, No vaginal bleeding , No loss of fluid     This pregnancy has been complicated by no complications.     OB History    Para Term  AB Living   1 0 0 0 0 0   SAB IAB Ectopic Multiple Live Births   0 0 0 0 0      # Outcome Date GA Lbr Juan/2nd Weight Sex Delivery Anes PTL Lv   1 Current              Past Medical History:   Diagnosis Date    Abnormal Pap smear of cervix     unsure of results- had bx which came back wnl per pt and normal pap since      Past Surgical History:   Procedure Laterality Date    WISDOM TOOTH EXTRACTION         (Not in a hospital admission)        Review of patient's allergies indicates:   Allergen Reactions    Penicillins Rash        Family History     Problem Relation (Age of Onset)    Diabetes Maternal Grandfather, Maternal Uncle    Lung cancer Paternal Grandmother    Sleep apnea Father    Stroke Paternal Grandfather        Tobacco Use    Smoking status: Never Smoker    Smokeless tobacco: Never Used   Substance and Sexual Activity    Alcohol use: Not Currently    Drug use: Never    Sexual activity: Yes     Partners: Male     Birth  control/protection: None     Review of Systems   Eyes: Negative for visual disturbance.   Gastrointestinal: Negative for abdominal pain, nausea and vomiting.   Genitourinary: Negative for vaginal bleeding.   Neurological: Negative for headaches.      Objective:     Vital Signs (Most Recent):  Temp: 97.7 °F (36.5 °C) (22 1219)  Pulse: 65 (22 1417)  Resp: 18 (22 1219)  BP: 98/69 (22 1417)  SpO2: 96 % (22 1400) Vital Signs (24h Range):  Temp:  [97.7 °F (36.5 °C)] 97.7 °F (36.5 °C)  Pulse:  [61-78] 65  Resp:  [18] 18  SpO2:  [95 %-98 %] 96 %  BP: ()/() 98/69        There is no height or weight on file to calculate BMI.    FHT: 145    Physical Exam:   Constitutional: She appears well-developed and well-nourished.       Cardiovascular: Normal rate, regular rhythm and normal heart sounds.     Pulmonary/Chest: Effort normal and breath sounds normal.        Abdominal: Bowel sounds are normal. She exhibits distension (gravid uterus).             Musculoskeletal: Normal range of motion. No edema.       Neurological: She is alert.    Skin: Skin is warm and dry.        Performed by resident physician.     Significant Labs:  Lab Results   Component Value Date    STREPBCULT No Group B Streptococcus isolated 2022       Assessment/Plan:     36 y.o. female  at 25w6d for: elevated blood pressure readings at OB office.     Active Diagnoses:    Diagnosis Date Noted POA    PRINCIPAL PROBLEM:  Gestational hypertension, third trimester [O13.3] 2022 Yes    Benign gestational thrombocytopenia in third trimester [O99.113, D69.6] 2022 Yes    Primigravida of advanced maternal age in third trimester [O09.513] 2022 Yes      Problems Resolved During this Admission:         Due to serial elevated blood pressure readings, patient meets criteria for gestational HTN. She was counseled on risks of elevated blood pressure in pregnancy. CBC, CMP, and urine came back WNL today.  Advised for admission and induction of labor due to elevated blood pressure. Patient states she needs to go home to charge her phone and get things in order for delivery. She is agreeable to signing out AMA and was provided information on risks of leaving at this time. Patient will return tomorrow for labor induction or sooner if she experiences any severe symptoms.     Cailin Ledezma MS3  Obstetrics  [facility] - Emergency Dept (OB)

## 2022-01-31 NOTE — ED PROVIDER NOTES
Encounter Date: 2022       History     Chief Complaint   Patient presents with    Hypertension     Shadia Lake is a 36 y.o. [id number] at 37w6d presents complaining of elevated blood pressures. The patient was seen by primary OB for routine prenatal visit where she was noted to have an elevated BP of 128/100. She was sent for further evaluation and PreE rule out. The patient denies headaches, changes in vision, chest pain, SOB, RUQ pain, or new onset edema.   This IUP is complicated by AMA.  Patient denies contractions, denies vaginal bleeding, denies LOF.   Fetal Movement: normal.        Review of patient's allergies indicates:   Allergen Reactions    Penicillins Rash     Past Medical History:   Diagnosis Date    Abnormal Pap smear of cervix     unsure of results- had bx which came back wnl per pt and normal pap since      Past Surgical History:   Procedure Laterality Date    WISDOM TOOTH EXTRACTION       Family History   Problem Relation Age of Onset    Stroke Paternal Grandfather     Lung cancer Paternal Grandmother     Diabetes Maternal Grandfather     Sleep apnea Father     Diabetes Maternal Uncle     Breast cancer Neg Hx     Colon cancer Neg Hx     Hypertension Neg Hx     Ovarian cancer Neg Hx      Social History     Tobacco Use    Smoking status: Never Smoker    Smokeless tobacco: Never Used   Substance Use Topics    Alcohol use: Not Currently    Drug use: Never     Review of Systems   Constitutional: Negative for chills, fatigue and fever.   HENT: Negative for congestion, rhinorrhea and sore throat.    Eyes: Negative for visual disturbance.   Respiratory: Negative for cough, chest tightness and shortness of breath.    Cardiovascular: Negative for chest pain and leg swelling.   Gastrointestinal: Negative for abdominal pain, nausea and vomiting.   Genitourinary: Negative for dysuria, pelvic pain, vaginal bleeding, vaginal discharge and vaginal pain.   Musculoskeletal: Negative for back  pain.   Neurological: Negative for dizziness and headaches.       Physical Exam     Initial Vitals   BP Pulse Resp Temp SpO2   01/31/22 1217 01/31/22 1217 01/31/22 1219 01/31/22 1219 01/31/22 1249   (!) 131/96 64 18 97.7 °F (36.5 °C) 98 %      MAP       --              Temp:  [97.7 °F (36.5 °C)] 97.7 °F (36.5 °C)  Pulse:  [61-78] 65  Resp:  [18] 18  SpO2:  [95 %-98 %] 96 %  BP: ()/() 98/69      Physical Exam    Constitutional: She appears well-developed and well-nourished. No distress.   HENT:   Head: Normocephalic and atraumatic.   Eyes: Conjunctivae and EOM are normal.   Neck: Neck supple. No thyromegaly present.   Normal range of motion.  Cardiovascular: Normal rate.   Pulmonary/Chest:   Normal work of breathing.   Abdominal: Abdomen is soft. There is no abdominal tenderness.   Gravid uterus. There is no rebound and no guarding.   Genitourinary:    Uterus normal.     Musculoskeletal:         General: No tenderness or edema. Normal range of motion.      Cervical back: Normal range of motion and neck supple.     Neurological: She is alert and oriented to person, place, and time. She has normal reflexes.   Skin: Skin is warm and dry.   Psychiatric: She has a normal mood and affect. Thought content normal.         ED Course   Obtain Fetal nonstress test (NST)    Date/Time: 1/31/2022 1:07 PM  Performed by: Oma Peng MD  Authorized by: Oma Peng MD     Nonstress Test:     Variability:  6-25 BPM    Decelerations:  None    Accelerations:  15 bpm    Baseline:  130    Contractions:  Not present  Biophysical Profile:     Nonstress Test Interpretation: reactive      Overall Impression:  Reassuring  Post-procedure:     Patient tolerance:  Patient tolerated the procedure well with no immediate complications      Labs Reviewed   CBC W/ AUTO DIFFERENTIAL - Abnormal; Notable for the following components:       Result Value    MCH 31.5 (*)     Platelets 146 (*)     Immature Granulocytes 0.7 (*)     Immature  Grans (Abs) 0.06 (*)     All other components within normal limits   COMPREHENSIVE METABOLIC PANEL - Abnormal; Notable for the following components:    Sodium 135 (*)     CO2 16 (*)     Albumin 2.9 (*)     Alkaline Phosphatase 176 (*)     ALT 9 (*)     All other components within normal limits   PROTEIN / CREATININE RATIO, URINE    Narrative:     Specimen Source->Urine   RUBELLA ANTIBODY, IGG   SARS-COV-2 RDRP GENE   TYPE AND SCREEN LABOR & DELIVERY          Imaging Results    None          Medications   lactated ringers bolus 1,000 mL (has no administration in time range)   lactated ringers bolus 500 mL (has no administration in time range)   lactated ringers infusion (has no administration in time range)   0.9%  NaCl infusion (has no administration in time range)   calcium carbonate 200 mg calcium (500 mg) chewable tablet 500 mg (has no administration in time range)   simethicone chewable tablet 80 mg (has no administration in time range)   ondansetron disintegrating tablet 8 mg (has no administration in time range)   prochlorperazine injection Soln 5 mg (has no administration in time range)   oxytocin 30 units in 500 mL lactated ringers infusion (non-titrating) (has no administration in time range)   oxytocin 30 units in 500 mL lactated ringers infusion (non-titrating) (has no administration in time range)   tranexamic acid (CYKLOKAPRON) 1,000 mg in sodium chloride 0.9 % 100 mL (ready to mix system) (has no administration in time range)   azithromycin 500 mg in dextrose 5 % 250 mL IVPB (ready to mix system) (has no administration in time range)   cefazolin (ANCEF) 2 gram in dextrose 5% 50 mL IVPB (premix) (has no administration in time range)     Medical Decision Making:   ED Management:  Two mild range blood pressures noted upon patient's arrival  PreE labs collected:     - P:C too low to calculate     - CMP wnl     - Platelets 146  Patient is asymptomatic  Patient now meets criteria for gestational hypertension  and recommendation for admission for induction of labor discussed  Patient states that she cannot stay for induction at this time due to life events (closing of new house, needing to install carseat), however she is amendable to returning for induction tomorrow morning  Primary OB contacted and induction scheduled for 5 AM tomorrow morning  AMA form reviewed and signed with patient. Patient understands risks of development of severe pre-eclampsia, eclampsia, matternal stroke, CVA, death, placenta abruption and hemorrhage, or fetal death  Stressed the importance of returning if she develops persistent headache, changes in vision, chest pain, SOB, RUQ pain, new onset edema, or if she just doesn't feel like herself.  Patient states she has connected mom and will check her BP at home and return if she has severe range BPs.             Attending Attestation:   Physician Attestation Statement for Resident:  As the supervising MD   Physician Attestation Statement: I have personally seen and examined this patient.   I agree with the above history. -:   As the supervising MD I agree with the above PE.    As the supervising MD I agree with the above treatment, course, plan, and disposition.  I was personally present during the critical portions of the procedure(s) performed by the resident and was immediately available in the ED to provide services and assistance as needed during the entire procedure.  I have reviewed and agree with the residents interpretation of the following: lab data.  I have reviewed the following: old records at this facility.                         Clinical Impression:   Final diagnoses:  [O13.3] Gestational hypertension, third trimester (Primary)  [Z3A.37] 37 weeks gestation of pregnancy          ED Disposition Condition    AMA               Heriberto Shaw MD  Resident  01/31/22 1510       Cheryl Fraire MD  01/31/22 1612

## 2022-01-31 NOTE — Clinical Note
I certify that Inpatient services for greater than or equal to 2 midnights are medically necessary:: Yes   Transfer To (Destination): [facility] LABOR AND DELIVERY [[id number]]   Diagnosis: Gestational hypertension, third trimester [166104]   Admitting Provider:: LOKI RAMOS [[id number]]   Future Attending Provider: KARAN ARAUJO [[id number]]   Reason for IP Medical Treatment  (Clinical interventions that can only be accomplished in the IP setting? ) :: Labor and delivery   Estimated Length of Stay:: 2 midnights   Plans for Post-Acute care--if anticipated (pick the single best option):: A. No post acute care anticipated at this time   Special Needs:: No Special Needs [1]

## 2022-02-01 ENCOUNTER — HOSPITAL ENCOUNTER (INPATIENT)
Facility: OTHER | Age: 37
LOS: 2 days | Discharge: HOME | End: 2022-02-03
Attending: OBSTETRICS & GYNECOLOGY | Admitting: OBSTETRICS & GYNECOLOGY
Payer: COMMERCIAL

## 2022-02-01 ENCOUNTER — ANESTHESIA EVENT (OUTPATIENT)
Dept: OBSTETRICS AND GYNECOLOGY | Facility: OTHER | Age: 37
End: 2022-02-01
Payer: COMMERCIAL

## 2022-02-01 ENCOUNTER — ANESTHESIA (OUTPATIENT)
Dept: OBSTETRICS AND GYNECOLOGY | Facility: OTHER | Age: 37
End: 2022-02-01
Payer: COMMERCIAL

## 2022-02-01 DIAGNOSIS — Z34.90 TERM PREGNANCY: ICD-10-CM

## 2022-02-01 DIAGNOSIS — O13.3 GESTATIONAL HYPERTENSION, THIRD TRIMESTER: Primary | ICD-10-CM

## 2022-02-01 DIAGNOSIS — Z34.90 ENCOUNTER FOR INDUCTION OF LABOR: ICD-10-CM

## 2022-02-01 LAB
ABO + RH BLD: NORMAL
ALBUMIN SERPL BCP-MCNC: 3.1 G/DL (ref 3.5–5.2)
ALP SERPL-CCNC: 204 U/L (ref 55–135)
ALT SERPL W/O P-5'-P-CCNC: 14 U/L (ref 10–44)
ANION GAP SERPL CALC-SCNC: 12 MMOL/L (ref 8–16)
AST SERPL-CCNC: 32 U/L (ref 10–40)
BASOPHILS # BLD AUTO: 0.01 K/UL (ref 0–0.2)
BASOPHILS NFR BLD: 0.1 % (ref 0–1.9)
BILIRUB SERPL-MCNC: 0.4 MG/DL (ref 0.1–1)
BLD GP AB SCN CELLS X3 SERPL QL: NORMAL
BUN SERPL-MCNC: 12 MG/DL (ref 6–20)
CALCIUM SERPL-MCNC: 9.5 MG/DL (ref 8.7–10.5)
CHLORIDE SERPL-SCNC: 109 MMOL/L (ref 95–110)
CO2 SERPL-SCNC: 16 MMOL/L (ref 23–29)
CREAT SERPL-MCNC: 0.8 MG/DL (ref 0.5–1.4)
DIFFERENTIAL METHOD: ABNORMAL
EOSINOPHIL # BLD AUTO: 0.1 K/UL (ref 0–0.5)
EOSINOPHIL NFR BLD: 1.3 % (ref 0–8)
ERYTHROCYTE [DISTWIDTH] IN BLOOD BY AUTOMATED COUNT: 13 % (ref 11.5–14.5)
EST. GFR  (AFRICAN AMERICAN): >60 ML/MIN/1.73 M^2
EST. GFR  (NON AFRICAN AMERICAN): >60 ML/MIN/1.73 M^2
GLUCOSE SERPL-MCNC: 76 MG/DL (ref 70–110)
HCT VFR BLD AUTO: 44.6 % (ref 37–48.5)
HGB BLD-MCNC: 15.2 G/DL (ref 12–16)
IMM GRANULOCYTES # BLD AUTO: 0.02 K/UL (ref 0–0.04)
IMM GRANULOCYTES NFR BLD AUTO: 0.3 % (ref 0–0.5)
LYMPHOCYTES # BLD AUTO: 1.7 K/UL (ref 1–4.8)
LYMPHOCYTES NFR BLD: 25.6 % (ref 18–48)
MCH RBC QN AUTO: 31.2 PG (ref 27–31)
MCHC RBC AUTO-ENTMCNC: 34.1 G/DL (ref 32–36)
MCV RBC AUTO: 92 FL (ref 82–98)
MONOCYTES # BLD AUTO: 0.6 K/UL (ref 0.3–1)
MONOCYTES NFR BLD: 9 % (ref 4–15)
NEUTROPHILS # BLD AUTO: 4.3 K/UL (ref 1.8–7.7)
NEUTROPHILS NFR BLD: 63.7 % (ref 38–73)
NRBC BLD-RTO: 0 /100 WBC
PLATELET # BLD AUTO: 124 K/UL (ref 150–450)
PMV BLD AUTO: 12 FL (ref 9.2–12.9)
POTASSIUM SERPL-SCNC: 4.4 MMOL/L (ref 3.5–5.1)
PROT SERPL-MCNC: 6.5 G/DL (ref 6–8.4)
RBC # BLD AUTO: 4.87 M/UL (ref 4–5.4)
SARS-COV-2 RDRP RESP QL NAA+PROBE: NEGATIVE
SODIUM SERPL-SCNC: 137 MMOL/L (ref 136–145)
WBC # BLD AUTO: 6.76 K/UL (ref 3.9–12.7)

## 2022-02-01 PROCEDURE — C1751 CATH, INF, PER/CENT/MIDLINE: HCPCS | Performed by: ANESTHESIOLOGY

## 2022-02-01 PROCEDURE — 25000003 PHARM REV CODE 250

## 2022-02-01 PROCEDURE — 59200 PR INSERT CERVICAL DILATOR: ICD-10-PCS | Mod: ,,, | Performed by: OBSTETRICS & GYNECOLOGY

## 2022-02-01 PROCEDURE — U0002 COVID-19 LAB TEST NON-CDC: HCPCS | Performed by: OBSTETRICS & GYNECOLOGY

## 2022-02-01 PROCEDURE — 11000001 HC ACUTE MED/SURG PRIVATE ROOM

## 2022-02-01 PROCEDURE — 59200 INSERT CERVICAL DILATOR: CPT | Mod: ,,, | Performed by: OBSTETRICS & GYNECOLOGY

## 2022-02-01 PROCEDURE — 59400 PRA FULL ROUT OBSTE CARE,VAGINAL DELIV: ICD-10-PCS | Mod: QY,,, | Performed by: ANESTHESIOLOGY

## 2022-02-01 PROCEDURE — 59400 OBSTETRICAL CARE: CPT | Mod: QY,,, | Performed by: ANESTHESIOLOGY

## 2022-02-01 PROCEDURE — 63600175 PHARM REV CODE 636 W HCPCS: Performed by: ANESTHESIOLOGY

## 2022-02-01 PROCEDURE — 86850 RBC ANTIBODY SCREEN: CPT

## 2022-02-01 PROCEDURE — 62326 NJX INTERLAMINAR LMBR/SAC: CPT | Performed by: ANESTHESIOLOGY

## 2022-02-01 PROCEDURE — 25000003 PHARM REV CODE 250: Performed by: ANESTHESIOLOGY

## 2022-02-01 PROCEDURE — 80053 COMPREHEN METABOLIC PANEL: CPT | Performed by: OBSTETRICS & GYNECOLOGY

## 2022-02-01 PROCEDURE — 85025 COMPLETE CBC W/AUTO DIFF WBC: CPT

## 2022-02-01 PROCEDURE — 27200710 HC EPIDURAL INFUSION PUMP SET: Performed by: ANESTHESIOLOGY

## 2022-02-01 PROCEDURE — 63600175 PHARM REV CODE 636 W HCPCS

## 2022-02-01 PROCEDURE — 86900 BLOOD TYPING SEROLOGIC ABO: CPT

## 2022-02-01 RX ORDER — MISOPROSTOL 200 UG/1
800 TABLET ORAL
Status: DISCONTINUED | OUTPATIENT
Start: 2022-02-01 | End: 2022-02-02

## 2022-02-01 RX ORDER — ONDANSETRON 8 MG/1
8 TABLET, ORALLY DISINTEGRATING ORAL EVERY 8 HOURS PRN
Status: DISCONTINUED | OUTPATIENT
Start: 2022-02-01 | End: 2022-02-02

## 2022-02-01 RX ORDER — FENTANYL/BUPIVACAINE/NS/PF 2MCG/ML-.1
PLASTIC BAG, INJECTION (ML) INJECTION CONTINUOUS
Status: CANCELLED | OUTPATIENT
Start: 2022-02-01

## 2022-02-01 RX ORDER — OXYTOCIN/RINGER'S LACTATE 30/500 ML
0-30 PLASTIC BAG, INJECTION (ML) INTRAVENOUS CONTINUOUS
Status: DISCONTINUED | OUTPATIENT
Start: 2022-02-01 | End: 2022-02-02

## 2022-02-01 RX ORDER — SODIUM CITRATE AND CITRIC ACID MONOHYDRATE 334; 500 MG/5ML; MG/5ML
30 SOLUTION ORAL ONCE
Status: CANCELLED | OUTPATIENT
Start: 2022-02-01 | End: 2022-02-01

## 2022-02-01 RX ORDER — ACETAMINOPHEN 325 MG/1
650 TABLET ORAL ONCE
Status: DISCONTINUED | OUTPATIENT
Start: 2022-02-01 | End: 2022-02-02

## 2022-02-01 RX ORDER — LIDOCAINE HYDROCHLORIDE AND EPINEPHRINE 15; 5 MG/ML; UG/ML
INJECTION, SOLUTION EPIDURAL
Status: DISCONTINUED | OUTPATIENT
Start: 2022-02-01 | End: 2022-02-02

## 2022-02-01 RX ORDER — ACETAMINOPHEN 500 MG
TABLET ORAL
Status: DISPENSED
Start: 2022-02-01 | End: 2022-02-02

## 2022-02-01 RX ORDER — SODIUM CHLORIDE 9 MG/ML
INJECTION, SOLUTION INTRAVENOUS
Status: DISCONTINUED | OUTPATIENT
Start: 2022-02-01 | End: 2022-02-02

## 2022-02-01 RX ORDER — OXYTOCIN/RINGER'S LACTATE 30/500 ML
334 PLASTIC BAG, INJECTION (ML) INTRAVENOUS ONCE
Status: COMPLETED | OUTPATIENT
Start: 2022-02-01 | End: 2022-02-02

## 2022-02-01 RX ORDER — FAMOTIDINE 10 MG/ML
20 INJECTION INTRAVENOUS ONCE
Status: CANCELLED | OUTPATIENT
Start: 2022-02-01 | End: 2022-02-01

## 2022-02-01 RX ORDER — FENTANYL/BUPIVACAINE/NS/PF 2MCG/ML-.1
PLASTIC BAG, INJECTION (ML) INJECTION
Status: COMPLETED
Start: 2022-02-01 | End: 2022-02-01

## 2022-02-01 RX ORDER — SIMETHICONE 80 MG
1 TABLET,CHEWABLE ORAL 4 TIMES DAILY PRN
Status: DISCONTINUED | OUTPATIENT
Start: 2022-02-01 | End: 2022-02-02

## 2022-02-01 RX ORDER — OXYTOCIN/RINGER'S LACTATE 30/500 ML
95 PLASTIC BAG, INJECTION (ML) INTRAVENOUS ONCE
Status: COMPLETED | OUTPATIENT
Start: 2022-02-01 | End: 2022-02-02

## 2022-02-01 RX ORDER — PROCHLORPERAZINE EDISYLATE 5 MG/ML
5 INJECTION INTRAMUSCULAR; INTRAVENOUS EVERY 6 HOURS PRN
Status: DISCONTINUED | OUTPATIENT
Start: 2022-02-01 | End: 2022-02-02

## 2022-02-01 RX ORDER — BUPIVACAINE HYDROCHLORIDE 2.5 MG/ML
INJECTION, SOLUTION EPIDURAL; INFILTRATION; INTRACAUDAL
Status: DISPENSED
Start: 2022-02-01 | End: 2022-02-02

## 2022-02-01 RX ORDER — CALCIUM CARBONATE 200(500)MG
500 TABLET,CHEWABLE ORAL 3 TIMES DAILY PRN
Status: DISCONTINUED | OUTPATIENT
Start: 2022-02-01 | End: 2022-02-02

## 2022-02-01 RX ORDER — MIDAZOLAM HYDROCHLORIDE 1 MG/ML
INJECTION INTRAMUSCULAR; INTRAVENOUS
Status: DISCONTINUED | OUTPATIENT
Start: 2022-02-01 | End: 2022-02-02

## 2022-02-01 RX ORDER — TERBUTALINE SULFATE 1 MG/ML
0.25 INJECTION SUBCUTANEOUS
Status: DISCONTINUED | OUTPATIENT
Start: 2022-02-01 | End: 2022-02-02

## 2022-02-01 RX ORDER — FENTANYL CITRATE 50 UG/ML
INJECTION, SOLUTION INTRAMUSCULAR; INTRAVENOUS
Status: COMPLETED
Start: 2022-02-01 | End: 2022-02-01

## 2022-02-01 RX ORDER — FENTANYL CITRATE 50 UG/ML
INJECTION, SOLUTION INTRAMUSCULAR; INTRAVENOUS
Status: DISCONTINUED | OUTPATIENT
Start: 2022-02-01 | End: 2022-02-02

## 2022-02-01 RX ORDER — SODIUM CHLORIDE, SODIUM LACTATE, POTASSIUM CHLORIDE, CALCIUM CHLORIDE 600; 310; 30; 20 MG/100ML; MG/100ML; MG/100ML; MG/100ML
INJECTION, SOLUTION INTRAVENOUS CONTINUOUS
Status: DISCONTINUED | OUTPATIENT
Start: 2022-02-01 | End: 2022-02-02

## 2022-02-01 RX ORDER — MISOPROSTOL 100 MCG
25 TABLET ORAL EVERY 4 HOURS PRN
Status: DISCONTINUED | OUTPATIENT
Start: 2022-02-01 | End: 2022-02-02

## 2022-02-01 RX ORDER — CEFAZOLIN SODIUM 2 G/50ML
2 SOLUTION INTRAVENOUS ONCE AS NEEDED
Status: DISCONTINUED | OUTPATIENT
Start: 2022-02-01 | End: 2022-02-02

## 2022-02-01 RX ORDER — TRANEXAMIC ACID 100 MG/ML
1000 INJECTION, SOLUTION INTRAVENOUS ONCE AS NEEDED
Status: DISCONTINUED | OUTPATIENT
Start: 2022-02-01 | End: 2022-02-02

## 2022-02-01 RX ORDER — FENTANYL/BUPIVACAINE/NS/PF 2MCG/ML-.1
PLASTIC BAG, INJECTION (ML) INJECTION CONTINUOUS PRN
Status: DISCONTINUED | OUTPATIENT
Start: 2022-02-01 | End: 2022-02-02

## 2022-02-01 RX ORDER — OXYTOCIN/RINGER'S LACTATE 30/500 ML
0-30 PLASTIC BAG, INJECTION (ML) INTRAVENOUS CONTINUOUS
Status: DISCONTINUED | OUTPATIENT
Start: 2022-02-01 | End: 2022-02-01 | Stop reason: SDUPTHER

## 2022-02-01 RX ADMIN — Medication 10 ML/HR: at 06:02

## 2022-02-01 RX ADMIN — MISOPROSTOL 50 MCG: 100 TABLET ORAL at 07:02

## 2022-02-01 RX ADMIN — Medication 10 ML: at 06:02

## 2022-02-01 RX ADMIN — MIDAZOLAM HYDROCHLORIDE 2 MG: 1 INJECTION, SOLUTION INTRAMUSCULAR; INTRAVENOUS at 06:02

## 2022-02-01 RX ADMIN — LIDOCAINE HYDROCHLORIDE,EPINEPHRINE BITARTRATE 3 ML: 15; .005 INJECTION, SOLUTION EPIDURAL; INFILTRATION; INTRACAUDAL; PERINEURAL at 06:02

## 2022-02-01 RX ADMIN — FENTANYL CITRATE 100 MCG: 50 INJECTION, SOLUTION INTRAMUSCULAR; INTRAVENOUS at 06:02

## 2022-02-01 RX ADMIN — Medication 2 MILLI-UNITS/MIN: at 12:02

## 2022-02-01 NOTE — PLAN OF CARE
02/01/22 1527   OB Discharge Planning Assessment   Assessment Type Discharge Planning Assessment   Source of Information patient   Lives With spouse   Relationship Status    Other children (include names and ages) none   Highest Level of Education Master's Degree   Father's Involvement Fully Involved   Is Father signing the birth certificate Yes   Received Prenatal Care Yes   Transportation Anticipated family or friend will provide   Receive WIC Benefits N/A   Adoption Planned no   Does baby have crib or safe sleep space? Yes   Do you have a car seat? Yes   Has other essential care items? Clothing;Bottles;Diapers   Potential Discharge Needs None   Discharge Plan A Home       Introduced self to pt and explained sw role. Provided pt with extra emotional support and reassurance as she transitions to being a parent. Supportive counseling also provided. Resources listed in pt AVS in the event she decides she needs counseling post partum. No other anticipated d/c needs at this time. Should any d/c needs arise, please consult social work. Emotional support provided.

## 2022-02-01 NOTE — CARE UPDATE
Resident to bedside for skaggs balloon placement. SVE 0.5/50/-4. Skaggs balloon placed without difficulty.     Cytotec given at 0744  Skaggs balloon placed without difficulty     Lorene Moreno MD/MPH  OB/GYN PGY-1   [facility]

## 2022-02-01 NOTE — INTERVAL H&P NOTE
The patient has been examined and the H&P has been reviewed:    I concur with the findings and no changes have occurred since H&P was written.    Shadia Lake is 36 y.o.  at 38w0d wga presenting for IOL secondary to gHTN.     FHT: baseline 130 bpm, moderate BTBV, +accels, -decels; Cat 1 (reassuring)  [place]: q 5mins  Presentation: cephalic by ultrasound    SVE: ft/50/-3    Plan for IOL with cytotec and skaggs balloon.    Oma Peng MD  PGY-1 OB/GYN            Active Hospital Problems    Diagnosis  POA    *Encounter for induction of labor [Z34.90]  Not Applicable    Gestational hypertension, third trimester [O13.3]  Yes    Benign gestational thrombocytopenia in third trimester [O99.113, D69.6]  Yes    Primigravida of advanced maternal age in third trimester [O09.513]  Yes      Resolved Hospital Problems   No resolved problems to display.

## 2022-02-01 NOTE — CARE UPDATE
LABOR NOTE    S:  Complaints: No.  Epidural working:  not applicable  Resident to bedside for routine cervical exam.     O: /75   Pulse 76   LMP 2021 (Exact Date)   SpO2 98%   Breastfeeding No       FHT: 140 bpm, mod variability, + accels, - decels, Cat 1 (reassuring)  CTX: q 2 minutes  SVE: 5/80/-2      ASSESSMENT:   36 y.o.  at 38w0d, IOL    TIMELINE:   1145: 580/-2, skaggs balloon out   1400: 5/80/-2, pit at 6, declines AROM    PLAN:  - Patient considering epidural; would prefer AROM after epidural placement  - Continue Close Maternal/Fetal Monitoring  - Pitocin per augmentation protocol   - Recheck 2-4 hours or PRN    Jessy Harris MD  OBGYN, PGY-4

## 2022-02-01 NOTE — ANESTHESIA PREPROCEDURE EVALUATION
[facility]  Anesthesia Pre-Operative Evaluation         Patient Name: Shadia Lake  YOB: 1985  MRN: [id number]    2022      Shadia Lake is a 36 y.o. female  @ 38w0d who presents for IOL.  This IUP is complicated by gestational hypertension, gestational thrombocytopenia, advanced maternal age    OB History    Para Term  AB Living   1 0 0 0 0 0   SAB IAB Ectopic Multiple Live Births   0 0 0 0 0      # Outcome Date GA Lbr Juan/2nd Weight Sex Delivery Anes PTL Lv   1 Current                Review of patient's allergies indicates:   Allergen Reactions    Penicillins Rash       Wt Readings from Last 1 Encounters:   22 0851 74.6 kg (164 lb 9.2 oz)       BP Readings from Last 3 Encounters:   22 98/69   22 (!) 128/100   22 126/84       Patient Active Problem List   Diagnosis    Gestational hypertension, third trimester    Benign gestational thrombocytopenia in third trimester    Primigravida of advanced maternal age in third trimester       Past Surgical History:   Procedure Laterality Date    WISDOM TOOTH EXTRACTION         Social History     Socioeconomic History    Marital status:    Tobacco Use    Smoking status: Never Smoker    Smokeless tobacco: Never Used   Substance and Sexual Activity    Alcohol use: Not Currently    Drug use: Never    Sexual activity: Yes     Partners: Male     Birth control/protection: None         Chemistry        Component Value Date/Time     (L) 2022 1309    K 4.0 2022 1309     2022 1309    CO2 16 (L) 2022 1309    BUN 11 2022 1309    CREATININE 0.8 2022 1309    GLU 71 2022 1309        Component Value Date/Time    CALCIUM 9.4 2022 1309    ALKPHOS 176 (H) 2022 1309    AST 25 2022 1309    ALT 9 (L) 2022 1309    BILITOT 0.4 2022 1309    ESTGFRAFRICA >60 2022 1309    EGFRNONAA >60 2022 1309             Lab Results   Component Value Date    WBC 8.28 01/31/2022    HGB 13.0 01/31/2022    HCT 37.9 01/31/2022    MCV 92 01/31/2022     (L) 01/31/2022       No results for input(s): PT, INR, PROTIME, APTT in the last 72 hours.        Anesthesia Evaluation    I have reviewed the Patient Summary Reports.    I have reviewed the Nursing Notes.    I have reviewed the Medications.     Review of Systems  Anesthesia Hx:  No problems with previous Anesthesia Denies Hx of Anesthetic complications  Neg history of prior surgery. Denies Family Hx of Anesthesia complications.   Denies Personal Hx of Anesthesia complications.   Social:  No Alcohol Use, Non-Smoker    Hematology/Oncology:  Hematology Normal        Cardiovascular:  Cardiovascular Normal  ECG has been reviewed.    Pulmonary:  Pulmonary Normal    Renal/:  Renal/ Normal     Musculoskeletal:  Musculoskeletal Normal    Neurological:  Neurology Normal           Anesthesia Plan  Type of Anesthesia, risks & benefits discussed:  Anesthesia Type:  epidural, general, CSE, spinal    Patient's Preference:   Plan Factors:          Intra-op Monitoring Plan: standard ASA monitors  Intra-op Monitoring Plan Comments:   Post Op Pain Control Plan: multimodal analgesia  Post Op Pain Control Plan Comments:     Induction:   rapid sequence  Beta Blocker:  Patient is not currently on a Beta-Blocker (No further documentation required).       Informed Consent: Patient understands risks and agrees with Anesthesia plan.  Questions answered. Anesthesia consent signed with patient.  ASA Score: 2     Day of Surgery Review of History & Physical:    H&P update referred to the provider.         Ready For Surgery From Anesthesia Perspective.

## 2022-02-01 NOTE — CARE UPDATE
LABOR NOTE    S:  Complaints: No.  Epidural working:  not applicable  Resident to bedside for routine cervical exam.     O: /68   Pulse 62   LMP 2021 (Exact Date)   SpO2 98%   Breastfeeding No       FHT: 135 bpm, mod variability, + accels, - decels Cat 1 (reassuring)  CTX: q 2-3 minutes  SVE: /-2, skaggs balloon out       ASSESSMENT:   36 y.o.  at 38w0d, IOL    FHT reassuring    Active Hospital Problems    Diagnosis  POA    *Encounter for induction of labor [Z34.90]  Not Applicable    Gestational hypertension, third trimester [O13.3]  Yes    Benign gestational thrombocytopenia in third trimester [O99.113, D69.6]  Yes    Primigravida of advanced maternal age in third trimester [O09.513]  Yes      Resolved Hospital Problems   No resolved problems to display.     TIMELINE:   1145: /-2, skaggs balloon out     PLAN:    Continue Close Maternal/Fetal Monitoring  Will start pitocin per augmentation protocol   Recheck 2 hours or PRN  AROM when agreeable   Epidural when desires     Lorene Moreno MD/MPH  OB/GYN PGY-1   [facility]

## 2022-02-02 PROBLEM — Z34.90 ENCOUNTER FOR INDUCTION OF LABOR: Status: RESOLVED | Noted: 2022-02-01 | Resolved: 2022-02-02

## 2022-02-02 LAB
BASOPHILS # BLD AUTO: 0.04 K/UL (ref 0–0.2)
BASOPHILS NFR BLD: 0.2 % (ref 0–1.9)
DIFFERENTIAL METHOD: ABNORMAL
EOSINOPHIL # BLD AUTO: 0 K/UL (ref 0–0.5)
EOSINOPHIL NFR BLD: 0 % (ref 0–8)
ERYTHROCYTE [DISTWIDTH] IN BLOOD BY AUTOMATED COUNT: 13 % (ref 11.5–14.5)
HCT VFR BLD AUTO: 28.9 % (ref 37–48.5)
HGB BLD-MCNC: 10 G/DL (ref 12–16)
IMM GRANULOCYTES # BLD AUTO: 0.11 K/UL (ref 0–0.04)
IMM GRANULOCYTES NFR BLD AUTO: 0.5 % (ref 0–0.5)
LYMPHOCYTES # BLD AUTO: 2.7 K/UL (ref 1–4.8)
LYMPHOCYTES NFR BLD: 12.2 % (ref 18–48)
MCH RBC QN AUTO: 31.8 PG (ref 27–31)
MCHC RBC AUTO-ENTMCNC: 34.6 G/DL (ref 32–36)
MCV RBC AUTO: 92 FL (ref 82–98)
MONOCYTES # BLD AUTO: 1.7 K/UL (ref 0.3–1)
MONOCYTES NFR BLD: 7.6 % (ref 4–15)
NEUTROPHILS # BLD AUTO: 17.4 K/UL (ref 1.8–7.7)
NEUTROPHILS NFR BLD: 79.5 % (ref 38–73)
NRBC BLD-RTO: 0 /100 WBC
PLATELET # BLD AUTO: 125 K/UL (ref 150–450)
PMV BLD AUTO: 12 FL (ref 9.2–12.9)
RBC # BLD AUTO: 3.14 M/UL (ref 4–5.4)
WBC # BLD AUTO: 21.86 K/UL (ref 3.9–12.7)

## 2022-02-02 PROCEDURE — 36415 COLL VENOUS BLD VENIPUNCTURE: CPT | Performed by: OBSTETRICS & GYNECOLOGY

## 2022-02-02 PROCEDURE — 11000001 HC ACUTE MED/SURG PRIVATE ROOM

## 2022-02-02 PROCEDURE — 72200005 HC VAGINAL DELIVERY LEVEL II

## 2022-02-02 PROCEDURE — 25000003 PHARM REV CODE 250: Performed by: STUDENT IN AN ORGANIZED HEALTH CARE EDUCATION/TRAINING PROGRAM

## 2022-02-02 PROCEDURE — 99024 PR POST-OP FOLLOW-UP VISIT: ICD-10-PCS | Mod: ,,, | Performed by: OBSTETRICS & GYNECOLOGY

## 2022-02-02 PROCEDURE — 59400 OBSTETRICAL CARE: CPT | Mod: ,,, | Performed by: OBSTETRICS & GYNECOLOGY

## 2022-02-02 PROCEDURE — 99024 POSTOP FOLLOW-UP VISIT: CPT | Mod: ,,, | Performed by: OBSTETRICS & GYNECOLOGY

## 2022-02-02 PROCEDURE — 85025 COMPLETE CBC W/AUTO DIFF WBC: CPT | Performed by: OBSTETRICS & GYNECOLOGY

## 2022-02-02 PROCEDURE — 59400 PR FULL ROUT OBSTE CARE,VAGINAL DELIV: ICD-10-PCS | Mod: ,,, | Performed by: OBSTETRICS & GYNECOLOGY

## 2022-02-02 PROCEDURE — 63600175 PHARM REV CODE 636 W HCPCS: Performed by: OBSTETRICS & GYNECOLOGY

## 2022-02-02 RX ORDER — TRANEXAMIC ACID 100 MG/ML
INJECTION, SOLUTION INTRAVENOUS
Status: DISPENSED
Start: 2022-02-02 | End: 2022-02-02

## 2022-02-02 RX ORDER — DIPHENHYDRAMINE HYDROCHLORIDE 50 MG/ML
25 INJECTION INTRAMUSCULAR; INTRAVENOUS EVERY 4 HOURS PRN
Status: DISCONTINUED | OUTPATIENT
Start: 2022-02-02 | End: 2022-02-03 | Stop reason: HOSPADM

## 2022-02-02 RX ORDER — DIPHENHYDRAMINE HCL 25 MG
25 CAPSULE ORAL EVERY 4 HOURS PRN
Status: DISCONTINUED | OUTPATIENT
Start: 2022-02-02 | End: 2022-02-03 | Stop reason: HOSPADM

## 2022-02-02 RX ORDER — MISOPROSTOL 200 UG/1
TABLET ORAL
Status: DISPENSED
Start: 2022-02-02 | End: 2022-02-02

## 2022-02-02 RX ORDER — ONDANSETRON 8 MG/1
8 TABLET, ORALLY DISINTEGRATING ORAL EVERY 8 HOURS PRN
Status: DISCONTINUED | OUTPATIENT
Start: 2022-02-02 | End: 2022-02-03 | Stop reason: HOSPADM

## 2022-02-02 RX ORDER — HYDROCODONE BITARTRATE AND ACETAMINOPHEN 10; 325 MG/1; MG/1
1 TABLET ORAL EVERY 4 HOURS PRN
Status: DISCONTINUED | OUTPATIENT
Start: 2022-02-02 | End: 2022-02-03 | Stop reason: HOSPADM

## 2022-02-02 RX ORDER — HYDROCODONE BITARTRATE AND ACETAMINOPHEN 5; 325 MG/1; MG/1
1 TABLET ORAL EVERY 4 HOURS PRN
Status: DISCONTINUED | OUTPATIENT
Start: 2022-02-02 | End: 2022-02-03 | Stop reason: HOSPADM

## 2022-02-02 RX ORDER — ACETAMINOPHEN 325 MG/1
650 TABLET ORAL EVERY 6 HOURS PRN
Status: DISCONTINUED | OUTPATIENT
Start: 2022-02-02 | End: 2022-02-03 | Stop reason: HOSPADM

## 2022-02-02 RX ORDER — DOCUSATE SODIUM 100 MG/1
200 CAPSULE, LIQUID FILLED ORAL 2 TIMES DAILY PRN
Status: DISCONTINUED | OUTPATIENT
Start: 2022-02-02 | End: 2022-02-03 | Stop reason: HOSPADM

## 2022-02-02 RX ORDER — METHYLERGONOVINE MALEATE 0.2 MG/ML
INJECTION INTRAVENOUS
Status: DISPENSED
Start: 2022-02-02 | End: 2022-02-02

## 2022-02-02 RX ORDER — HYDROCORTISONE 25 MG/G
CREAM TOPICAL 3 TIMES DAILY PRN
Status: DISCONTINUED | OUTPATIENT
Start: 2022-02-02 | End: 2022-02-03 | Stop reason: HOSPADM

## 2022-02-02 RX ORDER — SODIUM CHLORIDE 0.9 % (FLUSH) 0.9 %
10 SYRINGE (ML) INJECTION
Status: DISCONTINUED | OUTPATIENT
Start: 2022-02-02 | End: 2022-02-03 | Stop reason: HOSPADM

## 2022-02-02 RX ORDER — OXYTOCIN/RINGER'S LACTATE 30/500 ML
95 PLASTIC BAG, INJECTION (ML) INTRAVENOUS ONCE
Status: DISCONTINUED | OUTPATIENT
Start: 2022-02-02 | End: 2022-02-02

## 2022-02-02 RX ORDER — PROCHLORPERAZINE EDISYLATE 5 MG/ML
5 INJECTION INTRAMUSCULAR; INTRAVENOUS EVERY 6 HOURS PRN
Status: DISCONTINUED | OUTPATIENT
Start: 2022-02-02 | End: 2022-02-03 | Stop reason: HOSPADM

## 2022-02-02 RX ORDER — PRENATAL WITH FERROUS FUM AND FOLIC ACID 3080; 920; 120; 400; 22; 1.84; 3; 20; 10; 1; 12; 200; 27; 25; 2 [IU]/1; [IU]/1; MG/1; [IU]/1; MG/1; MG/1; MG/1; MG/1; MG/1; MG/1; UG/1; MG/1; MG/1; MG/1; MG/1
1 TABLET ORAL DAILY
Status: DISCONTINUED | OUTPATIENT
Start: 2022-02-02 | End: 2022-02-03 | Stop reason: HOSPADM

## 2022-02-02 RX ORDER — CARBOPROST TROMETHAMINE 250 UG/ML
INJECTION, SOLUTION INTRAMUSCULAR
Status: DISPENSED
Start: 2022-02-02 | End: 2022-02-02

## 2022-02-02 RX ORDER — IBUPROFEN 600 MG/1
600 TABLET ORAL EVERY 6 HOURS PRN
Status: DISCONTINUED | OUTPATIENT
Start: 2022-02-02 | End: 2022-02-03 | Stop reason: HOSPADM

## 2022-02-02 RX ORDER — SIMETHICONE 80 MG
1 TABLET,CHEWABLE ORAL EVERY 6 HOURS PRN
Status: DISCONTINUED | OUTPATIENT
Start: 2022-02-02 | End: 2022-02-03 | Stop reason: HOSPADM

## 2022-02-02 RX ADMIN — Medication 95 MILLI-UNITS/MIN: at 01:02

## 2022-02-02 RX ADMIN — DOCUSATE SODIUM 200 MG: 100 CAPSULE ORAL at 08:02

## 2022-02-02 RX ADMIN — PRENATAL VIT W/ FE FUMARATE-FA TAB 27-0.8 MG 1 TABLET: 27-0.8 TAB at 10:02

## 2022-02-02 RX ADMIN — Medication 334 MILLI-UNITS/MIN: at 01:02

## 2022-02-02 RX ADMIN — IBUPROFEN 600 MG: 600 TABLET ORAL at 11:02

## 2022-02-02 RX ADMIN — IBUPROFEN 600 MG: 600 TABLET ORAL at 05:02

## 2022-02-02 RX ADMIN — DOCUSATE SODIUM 200 MG: 100 CAPSULE ORAL at 10:02

## 2022-02-02 RX ADMIN — ACETAMINOPHEN 650 MG: 325 TABLET, FILM COATED ORAL at 10:02

## 2022-02-02 RX ADMIN — ACETAMINOPHEN 650 MG: 325 TABLET, FILM COATED ORAL at 05:02

## 2022-02-02 RX ADMIN — IBUPROFEN 600 MG: 600 TABLET ORAL at 08:02

## 2022-02-02 NOTE — PROGRESS NOTES
02/01/22 1855   TeleCurtis Martinez Note - Strip   Strip Reviewed by Juan Nurse? Yes   TeleStork Juan Note - Communication   Sabin Nurse Communicated with Bedside Nurse Regarding: Fetal Status;Maternal Vital Signs  (maternal hypotension)   TeleCurtis Martinez Note - Notification   Nurse Notified? Yes

## 2022-02-02 NOTE — CARE UPDATE
MD to bedside for prolonged decel following epidural placement. Patient SROM during epidural placement. Pitocin off, fluids ran, patient repositioned. BP 70s/40s. Anesthesia at bedside, phenyelphrine administered. FHT improved/     SVE: 5/80/-2, IUPC placed      NST: Cat II, prolonged decel +variable decels +accels mod variability  [place]: q4    TIMELINE:   1145: 5/80/-2, skaggs balloon out   1400: 5/80/-2, pit at 6, declines AROM  1800: SROM clr   1900: 5/80/-2, pit off, IUPC placed     PLAN:  - Continue Close Maternal/Fetal Monitoring  - Pitocin per augmentation protocol   - Recheck 2-4 hours or PRN    Blossom Veras MD  PGY 1  Obstetrics and Gynecology

## 2022-02-02 NOTE — CARE UPDATE
10/100/+1, deep variables previously present - now improved.  Set up for delivery.     Sugar Cameron M.D.  OB/GYN PGY-3

## 2022-02-02 NOTE — PROGRESS NOTES
POSTPARTUM PROGRESS NOTE    Subjective:     PPD/POD#: 1   Procedure:    EGA: 38w1d   N/V: No   F/C: No   Abd Pain: Mild, well-controlled with oral pain medication   Lochia: Mild   Voiding: Yes   Ambulating: Yes   Bowel fnc: Yes   Breastfeeding: Yes   Contraception: Per primary OB   Circumcision: N/A, female   Patient reports having some burning near her urethra. Otherwise no complaints. Patient denies HA/SOB/CP/RUQ pain/visual disturbance.  Objective:      Temp:  [97.7 °F (36.5 °C)-100.2 °F (37.9 °C)] 97.7 °F (36.5 °C)  Pulse:  [52-94] 52  Resp:  [16-18] 18  SpO2:  [91 %-100 %] 96 %  BP: ()/(45-90) 112/62    Lung: Normal respiratory effort   Abdomen: Soft, appropriately tender   Uterus: Firm, no fundal tenderness   Incision: N/A   : Deferred   Extremities: No edema, 2+ DTRs.     Lab Review    Recent Labs   Lab 22  1309 22  0748   * 137   K 4.0 4.4    109   CO2 16* 16*   BUN 11 12   CREATININE 0.8 0.8   GLU 71 76   PROT 6.5 6.5   BILITOT 0.4 0.4   ALKPHOS 176* 204*   ALT 9* 14   AST 25 32       Recent Labs   Lab 22  1309 22  0748   WBC 8.28 6.76   HGB 13.0 15.2   HCT 37.9 44.6   MCV 92 92   * 124*         I/O    Intake/Output Summary (Last 24 hours) at 2022 1046  Last data filed at 2022 0546  Gross per 24 hour   Intake 64.8 ml   Output 1150 ml   Net -1085.2 ml        Assessment and Plan:   Postpartum care:  - Patient doing well.  - Continue routine management and advances.  - Discussed periurethral burning sensation is most likely secondary to her recent vaginal delivery with lacerations. Advised using Dermaplast spray as directed.     GHTN:  - Normoactive BP. Asymptomatic.   - PE unremarkable.   - Anti-hypertensive medication: not indicated.  - PreE labs wnl.  - FU in 1 week for BP check.  - continue to monitor.      GTP (146>124):   - stable on PE.  - continue to monitor.       Cheryl Fraire

## 2022-02-02 NOTE — L&D DELIVERY NOTE
"[facility]  Vaginal Delivery   Operative Note    SUMMARY     Normal spontaneous vaginal delivery of live infant, was placed on mothers abdomen for skin to skin and bulb suctioning performed.  Infant delivered position CHIRS over intact perineum.  Nuchal cord: No.    Spontaneous delivery of placenta and IV pitocin given noting good uterine tone.  2nd degree laceration noted and repaired in normal fashion with 3-0 vicryl. 1st degree right labial tear repaired with 3-0 vicryl..  Patient tolerated delivery well. Sponge needle and lap counted correctly x2.    Indications:  (spontaneous vaginal delivery)  Pregnancy complicated by:   Patient Active Problem List   Diagnosis    Gestational hypertension, third trimester    Benign gestational thrombocytopenia in third trimester    Primigravida of advanced maternal age in third trimester     (spontaneous vaginal delivery)     Admitting GA: 38w1d    Delivery Information for Yue Lake    Birth information:  YOB: 2024   Time of birth: 1:08 AM   Sex: female   Head Delivery Date/Time: 2022  1:08 AM   Delivery type: Vaginal, Spontaneous   Gestational Age: 38w1d    Delivery Providers    Delivering clinician: Mary Lou Welsh MD   Provider Role    LORA Dexter, LORA             Measurements    Weight: 3140 g  Weight (lbs): 6 lb 14.8 oz  Length: 50.8 cm  Length (in): 20"  Head circumference: 34.3 cm  Chest circumference: 33.7 cm         Apgars    Living status: Living  Apgars:  1 min.:  5 min.:  10 min.:  15 min.:  20 min.:    Skin color:  1  1       Heart rate:  2  2       Reflex irritability:  2  2       Muscle tone:  2  2       Respiratory effort:  2  2       Total:  9  9       Apgars assigned by: AIR DOMINIQUE RNC         Operative Delivery    Forceps attempted?: No  Vacuum extractor attempted?: No         Shoulder Dystocia    Shoulder dystocia present?: No           Presentation  "   Presentation: Vertex  Position: Right Occiput Anterior           Interventions/Resuscitation    Method: Bulb Suctioning, Tactile Stimulation       Cord    Vessels: 3 vessels  Complications: None  Delayed Cord Clamping?: Yes  Cord Clamped Date/Time: 2022  1:10 AM  Cord Blood Disposition: Sent with Baby  Gases Sent?: No  Stem Cell Collection (by MD): No       Placenta    Placenta delivery date/time: 2022 0112  Placenta removal: Spontaneous  Placenta appearance: Intact  Placenta disposition: family           Labor Events:       labor: No     Labor Onset Date/Time: 2022 07:44     Dilation Complete Date/Time: 2022 23:59     Start Pushing Date/Time: 2022 00:08       Start Pushing Date/Time: 2022 00:08     Rupture Date/Time: 22         Rupture type:          Fluid Amount:       Fluid Color: Clear      Fluid Odor:       Membrane Status: SRM (Spontaneous Rupture)               steroids: None     Antibiotics given for GBS: No     Induction: balloon dilation (Nolan);misoprostol     Indications for induction:  Hypertension     Augmentation: oxytocin     Indications for augmentation: Hypertension;Ineffective Contraction Pattern     Labor complications: None     Additional complications:          Cervical ripenin2022 7:44 AM      Misoprostol          Delivery:      Episiotomy: None     Indication for Episiotomy:       Perineal Lacerations: 2nd Repaired:  Yes   Periurethral Laceration: bilateral Repaired: Yes   Labial Laceration:   Repaired:     Sulcus Laceration:   Repaired:     Vaginal Laceration:   Repaired:     Cervical Laceration:   Repaired:     Repair suture:       Repair # of packets: 2     Last Value - EBL - Nursing (mL):       Sum - EBL - Nursing (mL): 0     Last Value - EBL - Anesthesia (mL):      Calculated QBL (mL): 350      Vaginal Sweep Performed: Yes     Surgicount Correct: Yes       Other providers:       Anesthesia    Method: Epidural           Details (if applicable):  Trial of Labor      Categorization:      Priority:     Indications for :     Incision Type:       Additional  information:  Forceps:    Vacuum:    Breech:    Observed anomalies    Other (Comments):

## 2022-02-02 NOTE — ANESTHESIA PROCEDURE NOTES
Epidural    Patient location during procedure: OB   Reason for block: primary anesthetic   Reason for block: labor analgesia requested by patient and obstetrician  Diagnosis: iup in labor   Start time: 2/1/2022 6:40 PM  Timeout: 2/1/2022 6:38 PM  End time: 2/1/2022 6:42 PM  Surgery related to: Vaginal Delivery    Staffing  Performing Provider: More Stuart MD  Authorizing Provider: More Stuart MD        Preanesthetic Checklist  Completed: patient identified, IV checked, site marked, risks and benefits discussed, surgical consent, monitors and equipment checked, pre-op evaluation, timeout performed, anesthesia consent given, hand hygiene performed and patient being monitored  Preparation  Patient position: sitting  Prep: ChloraPrep  Patient monitoring: Pulse Ox  Reason for block: primary anesthetic   Epidural  Skin Anesthetic: lidocaine 1%  Skin Wheal: 3 mL  Administration type: continuous  Approach: midline  Interspace: L3-4    Injection technique: NISHI saline  Needle and Epidural Catheter  Needle type: Tuohy   Needle gauge: 17  Needle length: 3.5 inches  Needle insertion depth: 6 cm  Catheter type: angelMD  Catheter size: 19 G  Catheter at skin depth: 10 cm  Insertion Attempts: 1  Test dose: 3 mL of lidocaine 1.5% with Epi 1-to-200,000  Additional Documentation: incremental injection, negative aspiration for heme and CSF, no paresthesia on injection, no signs/symptoms of IV or SA injection, no significant pain on injection and no significant complaints from patient  Needle localization: anatomical landmarks  Medications:  Volume per aspiration: 5 mL  Time between aspirations: 5 minutes  Assessment  Ease of block: easy  Patient's tolerance of the procedure: comfortable throughout block and no complaintsNo inadvertent dural puncture with Tuohy.  Dural puncture performed with spinal needle.

## 2022-02-02 NOTE — NURSING
Dr. Fraire notified of patient reporting tingling in her left thigh when she touches her thigh.  The patient was able to shower and ambulate back to bed without difficulty this am.  The patient also reports sharp, burning pain with urination.  I discussed using Dermoplast spray and when to use it after using the toilet.  Patient verbalized understanding.  Will continue to monitor closely.

## 2022-02-02 NOTE — CARE UPDATE
"RN called and notified me of patient's complaints of periurethral burning and "tingling" in left thigh when she touches it.   Advised usage of Dermaplast spray for periurethral burning.   Patient reports no motor/sensory deficit in left thigh per RN. Ambulating without difficulty. Advised observation for thigh tingling for now.   Patient also desires to have placental form signed for release of placental. Discussed with RN that a provider will sign at patient's next evaluation most likely tomorrow AM. Patient was also instructed to notify the provider at the time of evaluation as a reminder.   "

## 2022-02-02 NOTE — PLAN OF CARE
Vss. Ambulating and voiding. Mother breastfeeding with assistance. Pain well controlled with PO pain medication. Bleeding well controlled. Safety maintained. Instructed to call for ambulation. No complaints this shift.

## 2022-02-02 NOTE — PROGRESS NOTES
MD to bedside for repetitive late decels following a run of tachysystole.     SVE: 9/90/0 per Dr. Veras    Pitocin paused secondary to presence of tachysystole - contractions spaced out and immediate improvement in heart rate. Continue to monitor.     TIMELINE:   1145: 5/80/-2, skaggs balloon out   1400: 5/80/-2, pit at 6, declines AROM  1800: SROM clr   1900: 5/80/-2, pit off, IUPC placed   2330: 9/90/0, pit paused    PLAN:  - Continue Close Maternal/Fetal Monitoring  - Pitocin per augmentation protocol - plan to restart in 30 minutes  - Recheck 2 or PRN    Sugar Cameron M.D.  OB/GYN PGY-3

## 2022-02-03 VITALS
SYSTOLIC BLOOD PRESSURE: 110 MMHG | DIASTOLIC BLOOD PRESSURE: 75 MMHG | HEART RATE: 62 BPM | OXYGEN SATURATION: 94 % | TEMPERATURE: 96 F | RESPIRATION RATE: 18 BRPM

## 2022-02-03 PROCEDURE — 25000003 PHARM REV CODE 250: Performed by: STUDENT IN AN ORGANIZED HEALTH CARE EDUCATION/TRAINING PROGRAM

## 2022-02-03 RX ORDER — IBUPROFEN 600 MG/1
600 TABLET ORAL EVERY 6 HOURS PRN
Qty: 30 TABLET | Refills: 1 | Status: SHIPPED | OUTPATIENT
Start: 2022-02-03 | End: 2022-03-07

## 2022-02-03 RX ORDER — HYDROCODONE BITARTRATE AND ACETAMINOPHEN 5; 325 MG/1; MG/1
1 TABLET ORAL EVERY 6 HOURS PRN
Qty: 15 TABLET | Refills: 0 | Status: SHIPPED | OUTPATIENT
Start: 2022-02-03 | End: 2022-03-07

## 2022-02-03 RX ADMIN — IBUPROFEN 600 MG: 600 TABLET ORAL at 06:02

## 2022-02-03 RX ADMIN — PRENATAL VIT W/ FE FUMARATE-FA TAB 27-0.8 MG 1 TABLET: 27-0.8 TAB at 11:02

## 2022-02-03 RX ADMIN — IBUPROFEN 600 MG: 600 TABLET ORAL at 11:02

## 2022-02-03 RX ADMIN — DOCUSATE SODIUM 200 MG: 100 CAPSULE ORAL at 11:02

## 2022-02-03 NOTE — DISCHARGE SUMMARY
Delivery Discharge Summary  Obstetrics      Primary OB Clinician: Mary Lou Welsh MD      Admission date: 2022  Discharge date: 2022    Disposition: To home, self care    Discharge Diagnosis List:      Patient Active Problem List   Diagnosis    Gestational hypertension, third trimester    Benign gestational thrombocytopenia in third trimester    Primigravida of advanced maternal age in third trimester     (spontaneous vaginal delivery)       Procedure:     Hospital Course:  Shadia Lake is a 36 y.o. now , PPD #2 who was admitted on 2022 at 38w1d for IOL due to GHTN. Patient was subsequently admitted to labor and delivery unit with signed consents.     Labor course was uncomplicated and resulted in  without complications.     Please see delivery note for further details. Her postpartum course was uncomplicated. On discharge day, patient's pain is controlled with oral pain medications. Pt is tolerating ambulation without SOB or CP, and regular diet without N/V. Reports lochia is mild. Denies any HA, vision changes, F/C, LE swelling. Denies any breast pain/soreness.    Pt in stable condition and ready for discharge. She has been instructed to start and/or continue medications and follow up with her obstetrics provider as listed below.    Pertinent studies:  CBC  Recent Labs   Lab 22  1309 22  0748 22  1320   WBC 8.28 6.76 21.86*   HGB 13.0 15.2 10.0*   HCT 37.9 44.6 28.9*   MCV 92 92 92   * 124* 125*          Immunization History   Administered Date(s) Administered    COVID-19, MRNA, LN-S, PF (Pfizer) (Purple Cap) 2021, 2021    Influenza - Quadrivalent - PF *Preferred* (6 months and older) 10/18/2021    Tdap 2021        Delivery:    Episiotomy: None   Lacerations: 2nd   Repair suture:     Repair # of packets: 2   Blood loss (ml):       Birth information:  YOB: 2024   Time of birth: 1:08 AM   Sex: female   Delivery  type: Vaginal, Spontaneous   Gestational Age: 38w1d    Delivery Clinician:      Other providers:       Additional  information:  Forceps:    Vacuum:    Breech:    Observed anomalies      Living?:           APGARS  One minute Five minutes Ten minutes   Skin color:         Heart rate:         Grimace:         Muscle tone:         Breathing:         Totals: 9  9        Placenta: Delivered:       appearance      Patient Instructions:   Current Discharge Medication List      START taking these medications    Details   HYDROcodone-acetaminophen (NORCO) 5-325 mg per tablet Take 1 tablet by mouth every 6 (six) hours as needed for Pain (7-10.).  Qty: 15 tablet, Refills: 0    Comments: Quantity prescribed more than 7 day supply? No      ibuprofen (ADVIL,MOTRIN) 600 MG tablet Take 1 tablet (600 mg total) by mouth every 6 (six) hours as needed for Pain (1-6).  Qty: 30 tablet, Refills: 1      jack awad ointment Apply topically 3 (three) times daily. Apply after feeding. Do not wash off. This compounded medication expires in 30 days.  Qty: 30 g, Refills: 1         CONTINUE these medications which have NOT CHANGED    Details   docusate sodium (STOOL SOFTENER ORAL) Take by mouth.      omega-3 fatty acids/fish oil (FISH OIL-OMEGA-3 FATTY ACIDS) 300-1,000 mg capsule Take by mouth once daily.      prenatal vit calc,iron,folic (PRENATAL VITAMIN ORAL) Take by mouth.      psyllium seed, with dextrose, (FIBER ORAL) Take by mouth.         STOP taking these medications       aspirin (ECOTRIN) 81 MG EC tablet Comments:   Reason for Stopping:         calcium carbonate (TUMS) 200 mg calcium (500 mg) chewable tablet Comments:   Reason for Stopping:         doxylamine succinate (UNISOM, DOXYLAMINE, ORAL) Comments:   Reason for Stopping:               Discharge Procedure Orders   Diet Adult Regular     Pelvic Rest     Notify your health care provider if you experience any of the following:  temperature >100.4     Notify your health care  provider if you experience any of the following:  severe uncontrolled pain     Notify your health care provider if you experience any of the following:  persistent nausea and vomiting or diarrhea     Notify your health care provider if you experience any of the following:  persistent dizziness, light-headedness, or visual disturbances     Activity as tolerated        Follow-up Information     Schedule an appointment as soon as possible for a visit with Dr. Alana Montes.    Why: for any counseling needs during the post partum period.  Contact information:  [facility] location  [address] [address]    [phone]           Mary Lou Welsh MD In 1 week.    Specialties: Gynecology, Obstetrics, Obstetrics and Gynecology  Why: BP check  Contact information:  [address]  [address]  [facility] [id number] [phone]             Mary Lou Welsh MD In 6 weeks.    Specialties: Gynecology, Obstetrics, Obstetrics and Gynecology  Why: 6 week PP exam  Contact information:  [address]  [address]  [facility] [id number] [phone]

## 2022-02-03 NOTE — PROGRESS NOTES
POSTPARTUM PROGRESS NOTE    Subjective:     PPD/POD#: 2   Procedure:    EGA: 38w1d   N/V: No   F/C: No   Abd Pain: Mild, well-controlled with oral pain medication   Lochia: Mild   Voiding: Yes   Ambulating: Yes   Bowel fnc: Yes   Breastfeeding: Yes   Contraception: Per primary OB   Circumcision: N/A, female     Objective:      Temp:  [96.2 °F (35.7 °C)-97.8 °F (36.6 °C)] 96.2 °F (35.7 °C)  Pulse:  [61-77] 62  Resp:  [16-18] 18  SpO2:  [94 %-96 %] 94 %  BP: (105-117)/(66-75) 110/75    Lung: Normal respiratory effort   Abdomen: Soft, appropriately tender   Uterus: Firm, no fundal tenderness   Incision: N/A   : Perineum intact without sign of infection   Extremities: No edema     Lab Review    Recent Labs   Lab 22  1309 22  0748   * 137   K 4.0 4.4    109   CO2 16* 16*   BUN 11 12   CREATININE 0.8 0.8   GLU 71 76   PROT 6.5 6.5   BILITOT 0.4 0.4   ALKPHOS 176* 204*   ALT 9* 14   AST 25 32       Recent Labs   Lab 22  1309 22  0748 22  1320   WBC 8.28 6.76 21.86*   HGB 13.0 15.2 10.0*   HCT 37.9 44.6 28.9*   MCV 92 92 92   * 124* 125*         I/O  No intake or output data in the 24 hours ending 22 1106     Assessment and Plan:   Postpartum care:  - Patient doing well.  - Continue routine management and advances.    gHTN  - BP as above  - asymptomatic  - preE labs as above  - UOP: adequate  - Mag: not indicated  - Hypertensive agent not indicated    GTP  - Plts stable at 125      Tri Kelly

## 2022-02-03 NOTE — LACTATION NOTE
02/03/22 1050   Maternal Assessment   Breast Shape Bilateral:;round   Breast Density Bilateral:;soft   Areola Bilateral:;elastic   Nipples Bilateral:;everted   Maternal Infant Feeding   Maternal Emotional State assist needed  (minimal)   Infant Positioning cross-cradle   Signs of Milk Transfer audible swallow;infant jaw motion present   Latch Assistance yes   Lactation Referrals   Lactation Referrals outpatient lactation program  ([facility])   Assisted pt with cross cradle position. Baby latched easily to breast. Good tugs and pulls observed. Discharge breastfeeding education provided. Questions answered. Pt has lactation contact number and community resources.

## 2022-02-03 NOTE — PLAN OF CARE
Ambulating independently, voiding without difficulty, moderate lochia rubra no clots; pain controlled with Tylenol and Ibuprofen; Patient bonding well with infant. Will continue to monitor, offer support/assistance prn.

## 2022-02-03 NOTE — LACTATION NOTE
02/02/22 1610   Maternal Assessment   Breast Shape Bilateral:;round   Breast Density Bilateral:;soft   Areola Bilateral:;elastic   Nipples Bilateral:;everted   Maternal Infant Feeding   Maternal Emotional State assist needed   Infant Positioning clutch/football   Signs of Milk Transfer audible swallow;infant jaw motion present   Latch Assistance yes   Assisted pt with position and latch. Baby latched to both breast in football hold. Good tugs and pulls observed. Basic breastfeeding education provided. Questions answered. Skin to skin encouraged,

## 2022-02-04 ENCOUNTER — TELEPHONE (OUTPATIENT)
Dept: OBSTETRICS AND GYNECOLOGY | Facility: CLINIC | Age: 37
End: 2022-02-04
Payer: COMMERCIAL

## 2022-02-04 ENCOUNTER — PATIENT MESSAGE (OUTPATIENT)
Dept: OBSTETRICS AND GYNECOLOGY | Facility: CLINIC | Age: 37
End: 2022-02-04
Payer: COMMERCIAL

## 2022-02-08 NOTE — ANESTHESIA POSTPROCEDURE EVALUATION
Anesthesia Post Evaluation    Patient: Shadia Lake    Procedure(s) Performed: * No procedures listed *    Final Anesthesia Type: epidural      Patient location during evaluation: floor  Patient participation: Yes- Able to Participate  Level of consciousness: awake and alert  Post-procedure vital signs: reviewed and stable  Pain management: adequate  Airway patency: patent    PONV status at discharge: No PONV  Anesthetic complications: no      Cardiovascular status: blood pressure returned to baseline, hemodynamically stable and stable  Respiratory status: unassisted, spontaneous ventilation and room air  Hydration status: euvolemic  Follow-up not needed.          No case tracking events are documented in the log.      Pain/Carlos Score: No data recorded

## 2022-02-10 ENCOUNTER — PATIENT MESSAGE (OUTPATIENT)
Dept: OBSTETRICS AND GYNECOLOGY | Facility: CLINIC | Age: 37
End: 2022-02-10
Payer: COMMERCIAL

## 2022-02-11 NOTE — TELEPHONE ENCOUNTER
The pain sounds like pelvic pressure after delivery. Her bleeding may continue for up to 8 weeks after delivery. If she's breastfeeding then she may have heat/cold intolerance. Sounds pretty normal.

## 2022-02-11 NOTE — TELEPHONE ENCOUNTER
"I'm having a few symptoms I wanted to run by you. Starting yesterday I've been having some pain in my abdomen. Feels like a stomach ache but it hasn't really gone away. Also, my bleeding, while not heavy, hasn't been getting much lighter in terms of flow or color. Finally, I've had a couple "spells" where I get really cold all of sudden, usually in the evening.     Lower abdomen, not cramping more dull aching. Worse at night, when hitting pot holes and with palpation.  Denies urinary complaints.     Advised the cold spells could be hormonal as long as temp is normal. What do you think about the pain?   "

## 2022-03-02 ENCOUNTER — TELEPHONE (OUTPATIENT)
Dept: OBSTETRICS AND GYNECOLOGY | Facility: CLINIC | Age: 37
End: 2022-03-02

## 2022-03-02 NOTE — TELEPHONE ENCOUNTER
Nena Barreto S Staff 1 hour ago (2:37 PM)     JR    Dr Welsh pt calling, has pp appt on 3-21 but will be out of town needs to get something next week or the following. Pt #[phone]    Routing comment      Shadia Lake [phone]  Nena Wallace 1 hour ago (2:36 PM)

## 2022-03-02 NOTE — TELEPHONE ENCOUNTER
Pt. Cannot do 3-21-22 for her postpartum visit with , would like to be seen next week.  Rescheduled appt to 3-7-22 at 11am at [facility].

## 2022-03-06 ENCOUNTER — PATIENT MESSAGE (OUTPATIENT)
Dept: OBSTETRICS AND GYNECOLOGY | Facility: CLINIC | Age: 37
End: 2022-03-06
Payer: COMMERCIAL

## 2022-03-07 ENCOUNTER — POSTPARTUM VISIT (OUTPATIENT)
Dept: OBSTETRICS AND GYNECOLOGY | Facility: CLINIC | Age: 37
End: 2022-03-07
Attending: OBSTETRICS & GYNECOLOGY
Payer: COMMERCIAL

## 2022-03-07 VITALS — BODY MASS INDEX: 26.39 KG/M2 | WEIGHT: 148.94 LBS | HEIGHT: 63 IN

## 2022-03-07 PROCEDURE — 99999 PR PBB SHADOW E&M-EST. PATIENT-LVL III: ICD-10-PCS | Mod: PBBFAC,,, | Performed by: OBSTETRICS & GYNECOLOGY

## 2022-03-07 PROCEDURE — 0503F POSTPARTUM CARE VISIT: CPT | Mod: CPTII,S$GLB,, | Performed by: OBSTETRICS & GYNECOLOGY

## 2022-03-07 PROCEDURE — 0503F PR POSTPARTUM CARE VISIT: ICD-10-PCS | Mod: CPTII,S$GLB,, | Performed by: OBSTETRICS & GYNECOLOGY

## 2022-03-07 PROCEDURE — 99999 PR PBB SHADOW E&M-EST. PATIENT-LVL III: CPT | Mod: PBBFAC,,, | Performed by: OBSTETRICS & GYNECOLOGY

## 2022-03-07 RX ORDER — ACETAMINOPHEN AND CODEINE PHOSPHATE 120; 12 MG/5ML; MG/5ML
1 SOLUTION ORAL DAILY
Qty: 84 TABLET | Refills: 3 | Status: SHIPPED | OUTPATIENT
Start: 2022-03-07 | End: 2022-04-04 | Stop reason: SDUPTHER

## 2022-03-07 NOTE — PROGRESS NOTES
"36 y.o. female for postpartum visit.  Patient has no complaints.  Her delivery records were reviewed.  She is breast feeding infant. She plans to use ocps for contraception.    Patient reports she has tremendous support from her mom and spouse. Is starting to adjust to new motherhood and all the changes it brings. Makes a lot of breast milk and has a good bit stored. Has had some times where she's cried when overwhelmed but overall feels like she's coping well. Denies si/hi. Has started riding her bike and is doing a little work from home. Eager to lose weight.    Exam:  General - well appearing, no apparent distress  Vitals:    03/07/22 1108   Weight: 67.5 kg (148 lb 14.7 oz)   Height: 5' 3" (1.6 m)   PainSc: 0-No pain     Abdomen - soft, non tender, non distended   Incision - none.  Pelvic - normal external genitalia, any lacerations well healed               uterus non tender, appropriately sized  Extremeties - no edema    PP depression score: 12    A: encounter for postpartum assessment    P:   - reassured patient that it absolutely can take time for adjustments to happen all over in her new life. Weight loss will come; 6 weeks would be early to be back in pre-pregnancy clothes.  - will check on her next month to see how things are going. Encouraged patient to reach out to me if there's anything else I can do for her.  - return when due for annual      "

## 2022-04-02 ENCOUNTER — PATIENT MESSAGE (OUTPATIENT)
Dept: OBSTETRICS AND GYNECOLOGY | Facility: CLINIC | Age: 37
End: 2022-04-02
Payer: COMMERCIAL

## 2022-04-04 ENCOUNTER — PATIENT MESSAGE (OUTPATIENT)
Dept: OBSTETRICS AND GYNECOLOGY | Facility: CLINIC | Age: 37
End: 2022-04-04
Payer: COMMERCIAL

## 2022-04-04 RX ORDER — ACETAMINOPHEN AND CODEINE PHOSPHATE 120; 12 MG/5ML; MG/5ML
1 SOLUTION ORAL DAILY
Qty: 84 TABLET | Refills: 0 | Status: SHIPPED | OUTPATIENT
Start: 2022-04-04 | End: 2022-08-03 | Stop reason: SDUPTHER

## 2022-04-28 ENCOUNTER — PATIENT MESSAGE (OUTPATIENT)
Dept: OBSTETRICS AND GYNECOLOGY | Facility: CLINIC | Age: 37
End: 2022-04-28
Payer: COMMERCIAL

## 2022-05-09 PROBLEM — O13.3 GESTATIONAL HYPERTENSION, THIRD TRIMESTER: Status: RESOLVED | Noted: 2022-01-31 | Resolved: 2022-05-09

## 2022-08-03 ENCOUNTER — PATIENT MESSAGE (OUTPATIENT)
Dept: OBSTETRICS AND GYNECOLOGY | Facility: CLINIC | Age: 37
End: 2022-08-03
Payer: COMMERCIAL

## 2022-08-03 RX ORDER — ACETAMINOPHEN AND CODEINE PHOSPHATE 120; 12 MG/5ML; MG/5ML
1 SOLUTION ORAL DAILY
Qty: 84 TABLET | Refills: 2 | Status: SHIPPED | OUTPATIENT
Start: 2022-08-03 | End: 2023-09-20

## 2022-09-20 ENCOUNTER — PATIENT MESSAGE (OUTPATIENT)
Dept: OBSTETRICS AND GYNECOLOGY | Facility: CLINIC | Age: 37
End: 2022-09-20
Payer: COMMERCIAL

## 2023-09-20 ENCOUNTER — OFFICE VISIT (OUTPATIENT)
Dept: OBSTETRICS AND GYNECOLOGY | Facility: CLINIC | Age: 38
End: 2023-09-20
Attending: OBSTETRICS & GYNECOLOGY
Payer: COMMERCIAL

## 2023-09-20 VITALS
HEIGHT: 63 IN | WEIGHT: 138.75 LBS | BODY MASS INDEX: 24.59 KG/M2 | DIASTOLIC BLOOD PRESSURE: 74 MMHG | SYSTOLIC BLOOD PRESSURE: 102 MMHG

## 2023-09-20 DIAGNOSIS — Z01.419 ENCOUNTER FOR GYNECOLOGICAL EXAMINATION: Primary | ICD-10-CM

## 2023-09-20 DIAGNOSIS — Z11.51 SCREENING FOR HPV (HUMAN PAPILLOMAVIRUS): ICD-10-CM

## 2023-09-20 DIAGNOSIS — Z12.4 SCREENING FOR CERVICAL CANCER: ICD-10-CM

## 2023-09-20 DIAGNOSIS — Z12.31 ENCOUNTER FOR SCREENING MAMMOGRAM FOR BREAST CANCER: ICD-10-CM

## 2023-09-20 PROCEDURE — 99999 PR PBB SHADOW E&M-EST. PATIENT-LVL III: CPT | Mod: PBBFAC,,, | Performed by: OBSTETRICS & GYNECOLOGY

## 2023-09-20 PROCEDURE — 3078F DIAST BP <80 MM HG: CPT | Mod: CPTII,S$GLB,, | Performed by: OBSTETRICS & GYNECOLOGY

## 2023-09-20 PROCEDURE — 87624 HPV HI-RISK TYP POOLED RSLT: CPT | Performed by: OBSTETRICS & GYNECOLOGY

## 2023-09-20 PROCEDURE — 88141 PR  CYTOPATH CERV/VAG INTERPRET: ICD-10-PCS | Mod: ,,, | Performed by: PATHOLOGY

## 2023-09-20 PROCEDURE — 3078F PR MOST RECENT DIASTOLIC BLOOD PRESSURE < 80 MM HG: ICD-10-PCS | Mod: CPTII,S$GLB,, | Performed by: OBSTETRICS & GYNECOLOGY

## 2023-09-20 PROCEDURE — 1159F MED LIST DOCD IN RCRD: CPT | Mod: CPTII,S$GLB,, | Performed by: OBSTETRICS & GYNECOLOGY

## 2023-09-20 PROCEDURE — 3008F BODY MASS INDEX DOCD: CPT | Mod: CPTII,S$GLB,, | Performed by: OBSTETRICS & GYNECOLOGY

## 2023-09-20 PROCEDURE — 3074F PR MOST RECENT SYSTOLIC BLOOD PRESSURE < 130 MM HG: ICD-10-PCS | Mod: CPTII,S$GLB,, | Performed by: OBSTETRICS & GYNECOLOGY

## 2023-09-20 PROCEDURE — 88175 CYTOPATH C/V AUTO FLUID REDO: CPT | Performed by: PATHOLOGY

## 2023-09-20 PROCEDURE — 99395 PREV VISIT EST AGE 18-39: CPT | Mod: S$GLB,,, | Performed by: OBSTETRICS & GYNECOLOGY

## 2023-09-20 PROCEDURE — 3008F PR BODY MASS INDEX (BMI) DOCUMENTED: ICD-10-PCS | Mod: CPTII,S$GLB,, | Performed by: OBSTETRICS & GYNECOLOGY

## 2023-09-20 PROCEDURE — 88141 CYTOPATH C/V INTERPRET: CPT | Mod: ,,, | Performed by: PATHOLOGY

## 2023-09-20 PROCEDURE — 99999 PR PBB SHADOW E&M-EST. PATIENT-LVL III: ICD-10-PCS | Mod: PBBFAC,,, | Performed by: OBSTETRICS & GYNECOLOGY

## 2023-09-20 PROCEDURE — 1159F PR MEDICATION LIST DOCUMENTED IN MEDICAL RECORD: ICD-10-PCS | Mod: CPTII,S$GLB,, | Performed by: OBSTETRICS & GYNECOLOGY

## 2023-09-20 PROCEDURE — 3074F SYST BP LT 130 MM HG: CPT | Mod: CPTII,S$GLB,, | Performed by: OBSTETRICS & GYNECOLOGY

## 2023-09-20 PROCEDURE — 99395 PR PREVENTIVE VISIT,EST,18-39: ICD-10-PCS | Mod: S$GLB,,, | Performed by: OBSTETRICS & GYNECOLOGY

## 2023-09-20 RX ORDER — ACETAMINOPHEN AND CODEINE PHOSPHATE 120; 12 MG/5ML; MG/5ML
1 SOLUTION ORAL DAILY
Qty: 84 TABLET | Refills: 3 | Status: CANCELLED | OUTPATIENT
Start: 2023-09-20 | End: 2024-09-19

## 2023-09-20 RX ORDER — NORGESTIMATE AND ETHINYL ESTRADIOL 0.25-0.035
1 KIT ORAL DAILY
Qty: 84 TABLET | Refills: 3 | Status: SHIPPED | OUTPATIENT
Start: 2023-09-20 | End: 2024-09-19

## 2023-09-20 RX ORDER — METHYLPHENIDATE HYDROCHLORIDE EXTENDED RELEASE 20 MG/1
20 TABLET ORAL 2 TIMES DAILY
COMMUNITY
Start: 2023-09-05

## 2023-09-20 NOTE — PROGRESS NOTES
"Subjective:       Patient ID: Shadia Lake is a 37 y.o. female.    Chief Complaint:  Annual Exam (No pap on record; No mmg on record)      History of Present Illness  - here for annual. No longer breastfeeding; would like to switch to combo ocps.  - having a rough time at home. Seeking marriage counseling.    Past Medical History:   Diagnosis Date    Abnormal Pap smear of cervix     unsure of results- had bx which came back wnl per pt and normal pap since        Past Surgical History:   Procedure Laterality Date    WISDOM TOOTH EXTRACTION          Family History   Problem Relation Age of Onset    Stroke Paternal Grandfather     Lung cancer Paternal Grandmother     Diabetes Maternal Grandfather     Sleep apnea Father     Diabetes Maternal Uncle     Breast cancer Neg Hx     Colon cancer Neg Hx     Hypertension Neg Hx     Ovarian cancer Neg Hx         Social History     Socioeconomic History    Marital status:    Tobacco Use    Smoking status: Never    Smokeless tobacco: Never   Substance and Sexual Activity    Alcohol use: Not Currently    Drug use: Never    Sexual activity: Yes     Partners: Male     Birth control/protection: None           Objective:     Vitals:    09/20/23 0840   BP: 102/74   Weight: 62.9 kg (138 lb 12.5 oz)   Height: 5' 3" (1.6 m)       Physical Exam:   Constitutional: She is oriented to person, place, and time. She appears well-developed and well-nourished.        Pulmonary/Chest: Right breast exhibits no mass, no nipple discharge, no skin change, no tenderness and no swelling. Left breast exhibits no mass, no nipple discharge, no skin change, no tenderness and no swelling. Breasts are symmetrical.        Abdominal: Soft. She exhibits no distension. There is no abdominal tenderness.     Genitourinary:    Vagina and uterus normal.   There is no tenderness or lesion on the right labia. There is no tenderness or lesion on the left labia. Cervix is normal. Right adnexum displays no mass, no " tenderness and no fullness. Left adnexum displays no mass, no tenderness and no fullness. No  no vaginal discharge in the vagina.    pap smear completed          Musculoskeletal: Moves all extremeties.       Neurological: She is alert and oriented to person, place, and time.     Psychiatric: She has a normal mood and affect.        Assessment/ Plan:     Orders Placed This Encounter    HPV High Risk Genotypes, PCR    Mammo Digital Screening Bilat w/ Wale    Liquid-Based Pap Smear, Screening    norgestimate-ethinyl estradioL (ORTHO-CYCLEN) 0.25-35 mg-mcg per tablet       Shadia was seen today for annual exam.    Diagnoses and all orders for this visit:    Encounter for gynecological examination    Screening for HPV (human papillomavirus)  -     HPV High Risk Genotypes, PCR    Screening for cervical cancer  -     Liquid-Based Pap Smear, Screening    Encounter for screening mammogram for breast cancer  -     Mammo Digital Screening Bilat w/ Wale; Future    Other orders  -     norgestimate-ethinyl estradioL (ORTHO-CYCLEN) 0.25-35 mg-mcg per tablet; Take 1 tablet by mouth once daily.  The following orders have not been finalized:  -     Cancel: norethindrone (ORTHO MICRONOR) 0.35 mg tablet      - will check on patient.    Follow up in about 1 year (around 9/20/2024) for annual exam.    As of April 1, 2021, the Cures Act has been passed nationally. This new law requires that all doctors progress notes, lab results, pathology reports and radiology reports be released IMMEDIATELY to the patient in the patient portal. That means that the results are released to you at the EXACT same time they are released to me. Therefore, with all of the patients that I have I am not able to reply to each patient exactly when the results come in. So there will be a delay from when you see the results to when I see them and have time to come up with a response to send you. Also I only see these results when I am on the computer at work. So  if the results come in over the weekend or after 5 pm of a work day, I will not see them until the next business day. As you can tell, this is a challenge as a physician to give every patient the quick response they hope for and deserve. So please be patient!   Thanks for your understanding and patience.

## 2023-09-25 LAB
HPV HR 12 DNA SPEC QL NAA+PROBE: POSITIVE
HPV16 AG SPEC QL: NEGATIVE
HPV18 DNA SPEC QL NAA+PROBE: NEGATIVE

## 2023-09-26 ENCOUNTER — PATIENT MESSAGE (OUTPATIENT)
Dept: OBSTETRICS AND GYNECOLOGY | Facility: CLINIC | Age: 38
End: 2023-09-26
Payer: COMMERCIAL

## 2023-09-27 LAB
FINAL PATHOLOGIC DIAGNOSIS: ABNORMAL
Lab: ABNORMAL

## 2023-10-05 ENCOUNTER — HOSPITAL ENCOUNTER (OUTPATIENT)
Dept: RADIOLOGY | Facility: HOSPITAL | Age: 38
Discharge: HOME | End: 2023-10-05
Attending: OBSTETRICS & GYNECOLOGY
Payer: COMMERCIAL

## 2023-10-05 VITALS — WEIGHT: 135 LBS | BODY MASS INDEX: 23.91 KG/M2

## 2023-10-05 DIAGNOSIS — Z23 NEED FOR HPV VACCINATION: ICD-10-CM

## 2023-10-05 DIAGNOSIS — R87.618 PAP SMEAR ABNORMALITY OF CERVIX/HUMAN PAPILLOMAVIRUS (HPV) POSITIVE: Primary | ICD-10-CM

## 2023-10-05 DIAGNOSIS — Z12.31 ENCOUNTER FOR SCREENING MAMMOGRAM FOR BREAST CANCER: ICD-10-CM

## 2023-10-05 PROCEDURE — 77067 MAMMO DIGITAL SCREENING BILAT WITH TOMO: ICD-10-PCS | Mod: 26,,, | Performed by: RADIOLOGY

## 2023-10-05 PROCEDURE — 77067 SCR MAMMO BI INCL CAD: CPT | Mod: TC

## 2023-10-05 PROCEDURE — 77063 MAMMO DIGITAL SCREENING BILAT WITH TOMO: ICD-10-PCS | Mod: 26,,, | Performed by: RADIOLOGY

## 2023-10-05 PROCEDURE — 77067 SCR MAMMO BI INCL CAD: CPT | Mod: 26,,, | Performed by: RADIOLOGY

## 2023-10-05 PROCEDURE — 77063 BREAST TOMOSYNTHESIS BI: CPT | Mod: 26,,, | Performed by: RADIOLOGY

## 2023-10-25 ENCOUNTER — PATIENT MESSAGE (OUTPATIENT)
Dept: OBSTETRICS AND GYNECOLOGY | Facility: CLINIC | Age: 38
End: 2023-10-25
Payer: COMMERCIAL

## 2023-10-26 ENCOUNTER — TELEPHONE (OUTPATIENT)
Dept: OBSTETRICS AND GYNECOLOGY | Facility: CLINIC | Age: 38
End: 2023-10-26
Payer: COMMERCIAL

## 2023-10-26 NOTE — TELEPHONE ENCOUNTER
Spoke with patient. Seeing a marriage counselor. Getting advice from  and . She'll call if I can do anything for her. Will check on her next month.

## 2023-11-30 ENCOUNTER — PATIENT MESSAGE (OUTPATIENT)
Dept: OBSTETRICS AND GYNECOLOGY | Facility: CLINIC | Age: 38
End: 2023-11-30
Payer: COMMERCIAL

## 2023-12-04 ENCOUNTER — TELEPHONE (OUTPATIENT)
Dept: OBSTETRICS AND GYNECOLOGY | Facility: CLINIC | Age: 38
End: 2023-12-04
Payer: COMMERCIAL

## 2023-12-04 NOTE — TELEPHONE ENCOUNTER
Spoke with patient. Trying to get through the semester. Has been seeing a counselor but she had to cancel due to injury. Will continue to check on her.

## 2024-01-08 ENCOUNTER — TELEPHONE (OUTPATIENT)
Dept: OBSTETRICS AND GYNECOLOGY | Facility: CLINIC | Age: 39
End: 2024-01-08
Payer: COMMERCIAL

## 2024-01-08 ENCOUNTER — PATIENT MESSAGE (OUTPATIENT)
Dept: OBSTETRICS AND GYNECOLOGY | Facility: CLINIC | Age: 39
End: 2024-01-08
Payer: COMMERCIAL

## 2024-01-08 NOTE — TELEPHONE ENCOUNTER
Spoke with patient. Baby is doing well. Was trying counseling with  and he now refuses. Is starting to work with a [employer] . May be moving back to [facility]. Owns a house there that she owns out right. Applying for her old job.     Needs annual with pap/HPV with next annual.

## 2024-03-20 ENCOUNTER — CLINICAL SUPPORT (OUTPATIENT)
Dept: OTHER | Facility: CLINIC | Age: 39
End: 2024-03-20

## 2024-03-20 DIAGNOSIS — Z00.8 ENCOUNTER FOR OTHER GENERAL EXAMINATION: ICD-10-CM

## 2024-03-23 VITALS
WEIGHT: 140 LBS | SYSTOLIC BLOOD PRESSURE: 117 MMHG | DIASTOLIC BLOOD PRESSURE: 76 MMHG | HEIGHT: 64 IN | BODY MASS INDEX: 23.9 KG/M2

## 2024-03-23 LAB
HDLC SERPL-MCNC: 45 MG/DL
POC CHOLESTEROL, LDL (DOCK): 84 MG/DL
POC CHOLESTEROL, TOTAL: 151 MG/DL
POC GLUCOSE, FASTING: 93 MG/DL (ref 60–110)
TRIGL SERPL-MCNC: 120 MG/DL